# Patient Record
Sex: MALE | Race: WHITE | NOT HISPANIC OR LATINO | Employment: UNEMPLOYED | URBAN - METROPOLITAN AREA
[De-identification: names, ages, dates, MRNs, and addresses within clinical notes are randomized per-mention and may not be internally consistent; named-entity substitution may affect disease eponyms.]

---

## 2018-01-20 ENCOUNTER — HOSPITAL ENCOUNTER (INPATIENT)
Facility: CLINIC | Age: 33
LOS: 5 days | Discharge: NURSING, REHAB OR LONG-TERM CARE | End: 2018-01-26
Attending: INTERNAL MEDICINE | Admitting: PSYCHIATRY & NEUROLOGY
Payer: COMMERCIAL

## 2018-01-20 DIAGNOSIS — K59.00 CONSTIPATION, UNSPECIFIED CONSTIPATION TYPE: ICD-10-CM

## 2018-01-20 DIAGNOSIS — F19.159: ICD-10-CM

## 2018-01-20 DIAGNOSIS — R46.89 AGGRESSIVE BEHAVIOR: ICD-10-CM

## 2018-01-20 DIAGNOSIS — F19.10 POLYSUBSTANCE ABUSE (H): ICD-10-CM

## 2018-01-20 DIAGNOSIS — F32.1 MODERATE SINGLE CURRENT EPISODE OF MAJOR DEPRESSIVE DISORDER (H): Primary | ICD-10-CM

## 2018-01-20 DIAGNOSIS — F15.10 METHAMPHETAMINE ABUSE (H): ICD-10-CM

## 2018-01-20 DIAGNOSIS — S60.221A CONTUSION OF RIGHT HAND, INITIAL ENCOUNTER: ICD-10-CM

## 2018-01-20 DIAGNOSIS — F11.20 METHADONE MAINTENANCE THERAPY PATIENT (H): ICD-10-CM

## 2018-01-20 PROCEDURE — 25000132 ZZH RX MED GY IP 250 OP 250 PS 637: Performed by: INTERNAL MEDICINE

## 2018-01-20 PROCEDURE — 99285 EMERGENCY DEPT VISIT HI MDM: CPT | Mod: Z6 | Performed by: INTERNAL MEDICINE

## 2018-01-20 PROCEDURE — 80320 DRUG SCREEN QUANTALCOHOLS: CPT | Performed by: PSYCHIATRY & NEUROLOGY

## 2018-01-20 PROCEDURE — 80307 DRUG TEST PRSMV CHEM ANLYZR: CPT | Performed by: PSYCHIATRY & NEUROLOGY

## 2018-01-20 PROCEDURE — 99285 EMERGENCY DEPT VISIT HI MDM: CPT | Performed by: INTERNAL MEDICINE

## 2018-01-20 RX ORDER — OLANZAPINE 5 MG/1
10 TABLET, ORALLY DISINTEGRATING ORAL ONCE
Status: COMPLETED | OUTPATIENT
Start: 2018-01-20 | End: 2018-01-20

## 2018-01-20 RX ADMIN — OLANZAPINE 10 MG: 5 TABLET, ORALLY DISINTEGRATING ORAL at 23:54

## 2018-01-20 NOTE — IP AVS SNAPSHOT
[address] [address]    Phone:  [phone]                                       After Visit Summary   1/20/2018    Moisés Cohn    MRN: [id number]           After Visit Summary Signature Page     I have received my discharge instructions, and my questions have been answered. I have discussed any challenges I see with this plan with the nurse or doctor.    ..........................................................................................................................................  Patient/Patient Representative Signature      ..........................................................................................................................................  Patient Representative Print Name and Relationship to Patient    ..................................................               ................................................  Date                                            Time    ..........................................................................................................................................  Reviewed by Signature/Title    ...................................................              ..............................................  Date                                                            Time

## 2018-01-20 NOTE — IP AVS SNAPSHOT
MRN:[id number]                      After Visit Summary   1/20/2018    Moisés Cohn    MRN: [id number]           Thank you!     Thank you for choosing [facility] for your care. Our goal is always to provide you with excellent care.        Patient Information     Date Of Birth          1985        Designated Caregiver       Most Recent Value    Caregiver    Will someone help with your care after discharge? no [Pt reports that he is homeless]      About your hospital stay     You were admitted on:  January 21, 2018 You last received care in the:  [facility]    You were discharged on:  January 26, 2018       Who to Call     For medical emergencies, please call 911.  For non-urgent questions about your medical care, please call your primary care provider or clinic, None          Attending Provider     Provider Specialty    Moisés Govea MD Emergency Medicine    [facility], Fam Douglass MD Psychiatry    Fern Zhang MD Psychiatry       Primary Care Provider Fax #    Physician No Ref-Primary [phone]      Further instructions from your care team        Behavioral Discharge Planning and Instructions      Summary:  You were admitted on 1/20/2018  due to Chemical Use Issues.  You were treated by Dr. 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  Dr. French  Appointment:  [address] [place] [address]  Phone:  [phone]   -While you were hospitalized you had a Rule 25 Chemical Dependency Assessment completed.  This was sent to [facility] as well as [facility] for treatment options.  You will need to follow-up with them upon discharge.   Phone:   [address] [place] [address] [phone]    - Psychiatric Follow-Up  [facility]  Open for Psychiatry Walk-in from 9am-12pm  [facility]  [address] [place], [place]   Phone: [phone]  Fax:  [phone]    Monday, Tuesday, Thursday, Friday: 8 a.m. to 5 p.m.  Wednesday: 8 a.m. to 6 p.m.    Appointments: [phone]  Fax: [phone]    -Shelter's   You were provided with a [facility]. You are planning to go to a shelter. Here are a few additional options in [facility]!  [facility]  [address] [place] [address]  Phone: [phone]    [facility]  [address] [address]  Phone: [phone]    [address] [address]  Phone: [phone]    [address] [address]  Phone: [phone]  Attend all scheduled appointments with your outpatient providers. Call at least 24 hours in advance if you need to reschedule an appointment to ensure continued access to your outpatient providers.   Major Treatments, Procedures and Findings:  You were provided with: a psychiatric assessment, assessed for medical stability, medication evaluation and/or management and individual therapy    Symptoms to Report: feeling more aggressive, increased confusion, losing more sleep, mood getting worse or thoughts of suicide    Early warning signs can include: increased depression or anxiety sleep disturbances increased thoughts or behaviors of suicide or self-harm  increased unusual thinking, such as paranoia or hearing voices    Safety and Wellness:  Take all  "medicines as directed.  Make no changes unless your doctor suggests them.      Follow treatment recommendations.  Refrain from alcohol and non-prescribed drugs.  If there is a concern for safety, call 911.    Resources:   Crisis Intervention: 514.273.2019 or 865-037-5455 (TTY: 673.255.8304).  Call anytime for help.  National [place] on Mental Illness (www.[place].kevin.org): 532.386.1679 or 031-996-7606.  [place] Association for Children's Mental Health (www.mac.org): 865.411.4733.  Alcoholics Anonymous (www.alcoholics-anonymous.org): Check your phone book for your local chapter.  Suicide Awareness Voices of Education (SAVE) (www.save.org): 570-579-LSGW (7844)  National Suicide Prevention Line (www.mentalhealthmn.org): 846-215-ZCQM (9774)  Mental Health Consumer/Survivor Network of [place] (www.mhcsn.net): 582.627.9437 or 049-775-1500  Mental Health Association [place] (www.mentalhealth.org): 738.928.4300 or 199-524-2229  Self- Management and Recovery Training., [employer]-- Toll free: 866.878.1210  www.[employer]kids.S[employer]Clock  [facility] Crisis (COPE) Response - Adult 127 964-1017  [facility] Crisis Response - Adult 559 717-1208  Text 4 Life: txt \"LIFE\" to [address]10 for immediate support and crisis intervention  Crisis text line: Text \"START\" to 281-388. Free, confidential, 24/7.  Crisis Intervention: 741.566.8440 or 302-042-0233. Call anytime for help.   [facility] Health Crisis Team - Child: 100.876.4424  [facility] Mental Health Crisis Response Team - Child: [phone]    The treatment team has appreciated the opportunity to work with you and thank you for choosing the [facility].   If you have any questions or concerns our unit number is [phone].        Pending Results     No orders found from 1/18/2018 to 1/21/2018.            Admission Information     Date & Time Provider Department Dept. Phone    1/20/2018 Fern Zhang MD [address] [phone]      Your " "Vitals Were     Blood Pressure Pulse Temperature Respirations Weight Pulse Oximetry    111/69 63 96.8  F (36  C) (Tympanic) 16 76.7 kg (169 lb) 97%    BMI (Body Mass Index)                   24.96 kg/m2           Diagnostic [employer]     [employer] lets you send messages to your doctor, view your test results, renew your prescriptions, schedule appointments and more. To sign up, go to [email or web address] . Click on \"Log in\" on the left side of the screen, which will take you to the Welcome page. Then click on \"Sign up Now\" on the right side of the page.     You will be asked to enter the access code listed below, as well as some personal information. Please follow the directions to create your username and password.     Your access code is: [email or web address]  Expires: 2018 10:56 PM     Your access code will  in 90 days. If you need help or a new code, please call your [facility] clinic or [phone].        Care EveryWhere ID     This is your Care EveryWhere ID. This could be used by other organizations to access your [facility] medical records  [id number]        Equal Access to Services     DALTON GRIMES AH: Daniel Rice, wakit daley, qaleah kaalmakarlos lock, pop wills. So [facility] [phone].    ATENCIÓN: Si habla español, tiene a nelson disposición servicios gratuitos de asistencia lingüística. Shilpa al [phone].    We comply with applicable federal civil rights laws and [place] laws. We do not discriminate on the basis of race, color, national origin, age, disability, sex, sexual orientation, or gender identity.               Review of your medicines      START taking        Dose / Directions    docusate sodium 100 MG capsule   Commonly known as:  COLACE   Used for:  Constipation, unspecified constipation type        Dose:  100 mg   Take 1 capsule (100 mg) by mouth 2 times daily   Quantity:  60 capsule   Refills:  0       FLUoxetine 10 MG capsule "   Commonly known as:  PROzac   Used for:  Moderate single current episode of major depressive disorder (H)        Dose:  10 mg   Take 1 capsule (10 mg) by mouth daily   Quantity:  30 capsule   Refills:  0       gabapentin 300 MG capsule   Commonly known as:  NEURONTIN        Dose:  300 mg   Take 1 capsule (300 mg) by mouth 3 times daily   Quantity:  90 capsule   Refills:  0       OLANZapine 5 MG tablet   Commonly known as:  zyPREXA        Dose:  5 mg   Take 1 tablet (5 mg) by mouth At Bedtime   Quantity:  30 tablet   Refills:  0         CONTINUE these medicines which have NOT CHANGED        Dose / Directions    albuterol 90 MCG/ACT inhaler        1-2 puffs Q 4-6 hrs prn   Quantity:  1   Refills:  11       methadone 10 MG/ML (HIGH CONC) solution   Commonly known as:  DOLOPHINE-INTENSOL        Dose:  100 mg   Take 100 mg by mouth daily   Refills:  0            Where to get your medicines      These medications were sent to [facility] [address]  [address] [address]     Phone:  [phone]     docusate sodium 100 MG capsule    FLUoxetine 10 MG capsule    gabapentin 300 MG capsule    OLANZapine 5 MG tablet                Protect others around you: Learn how to safely use, store and throw away your medicines at www.disposemymeds.org.        Information about OPIOIDS     PRESCRIPTION OPIOIDS: WHAT YOU NEED TO KNOW    Prescription opioids can be used to help relieve moderate to severe pain and are often prescribed following a surgery or injury, or for certain health conditions. These medications can be an important part of treatment but also come with serious risks. It is important to work with your health care provider to make sure you are getting the safest, most effective care.    WHAT ARE THE RISKS AND SIDE EFFECTS OF OPIOID USE?  Prescription opioids carry serious risks of addiction and overdose, especially with prolonged use. An opioid overdose, often  marked by slowed breathing can cause sudden death. The use of prescription opioids can have a number of side effects as well, even when taken as directed:      Tolerance - meaning you might need to take more of a medication for the same pain relief    Physical dependence - meaning you have symptoms of withdrawal when a medication is stopped    Increased sensitivity to pain    Constipation    Nausea, vomiting, and dry mouth    Sleepiness and dizziness    Confusion    Depression    Low levels of testosterone that can result in lower sex drive, energy, and strength    Itching and sweating    RISKS ARE GREATER WITH:    History of drug misuse, substance use disorder, or overdose    Mental health conditions (such as depression or anxiety)    Sleep apnea    Older age (65 years or older)    Pregnancy    Avoid alcohol while taking prescription opioids.   Also, unless specifically advised by your health care provider, medications to avoid include:    Benzodiazepines (such as Xanax or Valium)    Muscle relaxants (such as Soma or Flexeril)    Hypnotics (such as Ambien or Lunesta)    Other prescription opioids    KNOW YOUR OPTIONS:  Talk to your health care provider about ways to manage your pain that do not involve prescription opioids. Some of these options may actually work better and have fewer risks and side effects:    Pain relievers such as acetaminophen, ibuprofen, and naproxen    Some medications that are also used for depression or seizures    Physical therapy and exercise    Cognitive behavioral therapy, a psychological, goal-directed approach, in which patients learn how to modify physical, behavioral, and emotional triggers of pain and stress    IF YOU ARE PRESCRIBED OPIOIDS FOR PAIN:    Never take opioids in greater amounts or more often than prescribed    Follow up with your primary health care provider and work together to create a plan on how to manage your pain.    Talk about ways to help manage your pain that  do not involve prescription opioids    Talk about all concerns and side effects    Help prevent misuse and abuse    Never sell or share prescription opioids    Never use another person's prescription opioids    Store prescription opioids in a secure place and out of reach of others (this may include visitors, children, friends, and family)    Visit www.cdc.gov/drugoverdose to learn about risks of opioid abuse and overdose    If you believe you may be struggling with addiction, tell your health care provider and ask for guidance or call [facility]'s National Helpline at 5-482-024-HELP    LEARN MORE / www.cdc.gov/drugoverdose/prescribing/guideline.html    Safely dispose of unused prescription opioids: Find your local drug take-back programs and more information about the importance of safe disposal at www.doseofreality.mn.gov             Medication List: This is a list of all your medications and when to take them. Check marks below indicate your daily home schedule. Keep this list as a reference.      Medications           Morning Afternoon Evening Bedtime As Needed    albuterol 90 MCG/ACT inhaler   1-2 puffs Q 4-6 hrs prn                                   docusate sodium 100 MG capsule   Commonly known as:  COLACE   Take 1 capsule (100 mg) by mouth 2 times daily   Last time this was given:  100 mg on 1/26/2018  7:52 AM                                      FLUoxetine 10 MG capsule   Commonly known as:  PROzac   Take 1 capsule (10 mg) by mouth daily   Last time this was given:  10 mg on 1/26/2018  7:52 AM                                   gabapentin 300 MG capsule   Commonly known as:  NEURONTIN   Take 1 capsule (300 mg) by mouth 3 times daily   Last time this was given:  300 mg on 1/26/2018  7:52 AM                                         methadone 10 MG/ML (HIGH CONC) solution   Commonly known as:  DOLOPHINE-INTENSOL   Take 100 mg by mouth daily                                   OLANZapine 5 MG tablet   Commonly known  as:  zyPREXA   Take 1 tablet (5 mg) by mouth At Bedtime   Last time this was given:  5 mg on 1/25/2018  7:55 PM

## 2018-01-21 ENCOUNTER — APPOINTMENT (OUTPATIENT)
Dept: GENERAL RADIOLOGY | Facility: CLINIC | Age: 33
End: 2018-01-21
Attending: INTERNAL MEDICINE
Payer: COMMERCIAL

## 2018-01-21 PROBLEM — F19.159: Status: ACTIVE | Noted: 2018-01-21

## 2018-01-21 LAB
AMPHETAMINES UR QL SCN: POSITIVE
BARBITURATES UR QL: NEGATIVE
BENZODIAZ UR QL: NEGATIVE
CANNABINOIDS UR QL SCN: NEGATIVE
COCAINE UR QL: NEGATIVE
ETHANOL UR QL SCN: NEGATIVE
OPIATES UR QL SCN: NEGATIVE

## 2018-01-21 PROCEDURE — 73130 X-RAY EXAM OF HAND: CPT | Mod: RT

## 2018-01-21 PROCEDURE — 12400003 ZZH R&B MH CRITICAL UMMC

## 2018-01-21 PROCEDURE — 99221 1ST HOSP IP/OBS SF/LOW 40: CPT | Mod: AI | Performed by: NURSE PRACTITIONER

## 2018-01-21 PROCEDURE — 25000132 ZZH RX MED GY IP 250 OP 250 PS 637: Performed by: NURSE PRACTITIONER

## 2018-01-21 RX ORDER — METHADONE HYDROCHLORIDE 10 MG/ML
100 CONCENTRATE ORAL DAILY
COMMUNITY

## 2018-01-21 RX ORDER — ALBUTEROL SULFATE 90 UG/1
2 AEROSOL, METERED RESPIRATORY (INHALATION) EVERY 4 HOURS PRN
Status: DISCONTINUED | OUTPATIENT
Start: 2018-01-21 | End: 2018-01-26 | Stop reason: HOSPADM

## 2018-01-21 RX ORDER — ACETAMINOPHEN 325 MG/1
650 TABLET ORAL EVERY 4 HOURS PRN
Status: DISCONTINUED | OUTPATIENT
Start: 2018-01-21 | End: 2018-01-26 | Stop reason: HOSPADM

## 2018-01-21 RX ORDER — TRAZODONE HYDROCHLORIDE 50 MG/1
50 TABLET, FILM COATED ORAL AT BEDTIME
Status: DISCONTINUED | OUTPATIENT
Start: 2018-01-21 | End: 2018-01-21

## 2018-01-21 RX ORDER — OLANZAPINE 5 MG/1
5 TABLET ORAL AT BEDTIME
Status: DISCONTINUED | OUTPATIENT
Start: 2018-01-21 | End: 2018-01-26 | Stop reason: HOSPADM

## 2018-01-21 RX ORDER — OLANZAPINE 10 MG/2ML
10 INJECTION, POWDER, FOR SOLUTION INTRAMUSCULAR 3 TIMES DAILY PRN
Status: DISCONTINUED | OUTPATIENT
Start: 2018-01-21 | End: 2018-01-26 | Stop reason: HOSPADM

## 2018-01-21 RX ORDER — POLYETHYLENE GLYCOL 3350 17 G
2-4 POWDER IN PACKET (EA) ORAL
Status: DISCONTINUED | OUTPATIENT
Start: 2018-01-21 | End: 2018-01-24

## 2018-01-21 RX ORDER — ONDANSETRON 4 MG/1
4 TABLET, ORALLY DISINTEGRATING ORAL EVERY 6 HOURS PRN
Status: DISCONTINUED | OUTPATIENT
Start: 2018-01-21 | End: 2018-01-26 | Stop reason: HOSPADM

## 2018-01-21 RX ORDER — TRAZODONE HYDROCHLORIDE 50 MG/1
50-100 TABLET, FILM COATED ORAL
Status: DISCONTINUED | OUTPATIENT
Start: 2018-01-21 | End: 2018-01-26 | Stop reason: HOSPADM

## 2018-01-21 RX ORDER — OLANZAPINE 5 MG/1
5-10 TABLET, ORALLY DISINTEGRATING ORAL 2 TIMES DAILY PRN
Status: DISCONTINUED | OUTPATIENT
Start: 2018-01-21 | End: 2018-01-21

## 2018-01-21 RX ORDER — OLANZAPINE 5 MG/1
5-10 TABLET, ORALLY DISINTEGRATING ORAL 3 TIMES DAILY PRN
Status: DISCONTINUED | OUTPATIENT
Start: 2018-01-21 | End: 2018-01-26 | Stop reason: HOSPADM

## 2018-01-21 RX ORDER — ALUMINA, MAGNESIA, AND SIMETHICONE 2400; 2400; 240 MG/30ML; MG/30ML; MG/30ML
30 SUSPENSION ORAL EVERY 4 HOURS PRN
Status: DISCONTINUED | OUTPATIENT
Start: 2018-01-21 | End: 2018-01-26 | Stop reason: HOSPADM

## 2018-01-21 RX ORDER — HYDROXYZINE HYDROCHLORIDE 25 MG/1
25-50 TABLET, FILM COATED ORAL EVERY 4 HOURS PRN
Status: DISCONTINUED | OUTPATIENT
Start: 2018-01-21 | End: 2018-01-26 | Stop reason: HOSPADM

## 2018-01-21 RX ORDER — IBUPROFEN 600 MG/1
600 TABLET, FILM COATED ORAL EVERY 6 HOURS PRN
Status: DISCONTINUED | OUTPATIENT
Start: 2018-01-21 | End: 2018-01-26 | Stop reason: HOSPADM

## 2018-01-21 RX ORDER — CLONIDINE HYDROCHLORIDE 0.1 MG/1
0.1 TABLET ORAL EVERY 6 HOURS PRN
Status: DISCONTINUED | OUTPATIENT
Start: 2018-01-21 | End: 2018-01-26 | Stop reason: HOSPADM

## 2018-01-21 RX ORDER — LOPERAMIDE HCL 2 MG
2 CAPSULE ORAL 4 TIMES DAILY PRN
Status: DISCONTINUED | OUTPATIENT
Start: 2018-01-21 | End: 2018-01-26 | Stop reason: HOSPADM

## 2018-01-21 RX ORDER — GABAPENTIN 100 MG/1
200 CAPSULE ORAL 3 TIMES DAILY
Status: DISCONTINUED | OUTPATIENT
Start: 2018-01-21 | End: 2018-01-24

## 2018-01-21 RX ADMIN — OLANZAPINE 5 MG: 5 TABLET, FILM COATED ORAL at 21:14

## 2018-01-21 RX ADMIN — GABAPENTIN 200 MG: 100 CAPSULE ORAL at 09:06

## 2018-01-21 RX ADMIN — GABAPENTIN 200 MG: 100 CAPSULE ORAL at 21:14

## 2018-01-21 RX ADMIN — GABAPENTIN 200 MG: 100 CAPSULE ORAL at 14:29

## 2018-01-21 ASSESSMENT — ACTIVITIES OF DAILY LIVING (ADL)
DRESS: SCRUBS (BEHAVIORAL HEALTH)
ORAL_HYGIENE: INDEPENDENT
LAUNDRY: UNABLE TO COMPLETE
ORAL_HYGIENE: INDEPENDENT
HYGIENE/GROOMING: INDEPENDENT
DRESS: SCRUBS (BEHAVIORAL HEALTH)
GROOMING: INDEPENDENT

## 2018-01-21 ASSESSMENT — ENCOUNTER SYMPTOMS
SLEEP DISTURBANCE: 1
SPEECH DIFFICULTY: 0
DIFFICULTY URINATING: 0
NERVOUS/ANXIOUS: 1
BACK PAIN: 0
VOMITING: 0
HEADACHES: 0
AGITATION: 1
NUMBNESS: 0
SHORTNESS OF BREATH: 0
WEAKNESS: 0
PALPITATIONS: 0
HALLUCINATIONS: 1
ABDOMINAL PAIN: 0
FEVER: 0
CHILLS: 0
NAUSEA: 0
COUGH: 0
ADENOPATHY: 0
LIGHT-HEADEDNESS: 0
NECK PAIN: 0
DYSPHORIC MOOD: 1

## 2018-01-21 NOTE — PLAN OF CARE
"Problem: Behavioral Disturbance  Goal: Behavioral Disturbance  Signs and symptoms of listed problems will be absent or manageable by discharge or transition of care.  31yo white male admitted voluntarily through [facility] ED per wheelchair.  Pt reports long history of Bipolar Illness and polydrug abuse  including IV Meth, Benzos, and ETOH  with last use  2 days ago.  Pt's tox screen is positive for amphetamines. On admission to the   ED pt had reported both homicidal and suicidal ideation.  He described severe agitation with 'out of control' behavior.  Prior to coming to  the hospital pt punched a wall injuring his R hand.  His hand xray is negative.  He was given PRN Zyprexa in the ED and had fallen asleep    prior to being brought to the unit.    Pt cooperated with safety search on arrival to the unit however he refused vitals and he refused to participate in the admission   Assessment.  He does say that he is homeless and lives with various people.  He denies medical issues.  He says that he has been   \"hospitalized more than 50 times and I can't remember the last time\".  He states \"I use IV meth every other day-like a 1/2 Gram\".  Pt is loud, hostile and dismissive. States \"I don't want to talk-I don't want to answer these questions-I just want to sleep.\"  \"I have auditory hallucinations and I'm violent as Fuck!\"    Pt was settled to bed almost immediately after his admission to the unit.          "

## 2018-01-21 NOTE — H&P
"IDENTIFYING INFORMATION:  Mr. Moisés Cohn is a 32-year-old male admitted to the [facility], [place], station [address] [place].  He was admitted as a voluntary patient through the emergency department due to mood instability, agitation and psychosis.  He has a history of abusing a variety of substances.  Methamphetamine is his drug of choice and U-tox was positive for amphetamines.      CHIEF COMPLAINT:  \"Really depressed.\"      HISTORY OF PRESENT ILLNESS:  Mr. Cohn provides minimal information for this assessment.  He was somnolent and agitated, and requested that the conversation be brief.        INTAKE DATA:  Records from the ER and records from his previous hospitalization were reviewed.      Mr. Cohn has previous diagnoses of depression versus bipolar disorder, generalized anxiety disorder, alcohol use disorder, cannabis use disorder, stimulant (methamphetamine) use disorder and opiate use disorder.  He states that he is homeless and stays \"anywhere,\" mostly outside.  He has not been taking medications recently and said that in general, \"I don't really take meds.\"  The patient states that he is on methadone maintenance 100 mg daily through [facility].  He says that his last dose was on Friday and that he does not have possession of his takeout doses for the weekend.  [facility] was closed and we have been unable to verify this.  U-tox did not show the presence of opiates.  He reports that he has been using IV methamphetamine \"every couple days\" with last use 2 days ago.  He currently reports that he last consumed alcohol two days ago and does not generally drink in excess.  He has been struggling with anger and mood instability.  He has had verbal and physical altercations including with members of the police force.  He recently punched a wall and has a swollen right hand.  X-ray showed no evidence of fracture.  He was voluntarily hospitalized on station [address] [place].  He refused to " "participate in the RN admission assessment, stating \"I have auditory hallucinations and I'm violent as fuck.\"   He was minimally cooperative with today's assessment.  He states that he is considering the possibility of CD treatment.      During the present assessment, the patient reports that he is \"hearing voices and really stressed out and really violent.\"  He is not sure what the auditory hallucinations say.  He denies visual hallucinations.  He endorses paranoid thought content that people want to harm him.  He believes that others can read his mind.  He denies receiving messages from the TV or radio.  His sleep and appetite are \"okay.\"  He continues to endorse thoughts of harming others, but nobody specifically.  He denies homicidal ideation.  He endorses racing thoughts and anxiety.       PAST PSYCHIATRIC HISTORY:  The patient reports a history of 2 prior psychiatric hospitalizations.  He was hospitalized at [facility] in 2012 and was discharged AMA after he eloped from a pass for Rule 25.  He has a history of suicide attempt in 2012 by shooting up meth.  He does not have a .  His psychiatrist is Dr. Vela at [facility].  ER notes indicate he saw his psychiatrist 2 days ago; however, he informed me he last saw him 1-2 months ago.  In the past he took Effexor, which caused \"electric jolts.\"  He also took BuSpar for a short period of time and it was ineffective.  He has also taken Wellbutrin; other people noticed that his mood appeared improved, but he did not notice a difference.  He says that Xanax was helpful in the past.  He has also taken Zoloft, lithium, Risperdal and Paxil.  He states that only Zyprexa and Neurontin are beneficial for him.  He denies any history of commitment.      SUBSTANCE USE HISTORY:  He began using marijuana and methamphetamine at age 13.  His use of methamphetamine has progressed and he now uses IV meth several days per week.  He reports occasional use of alcohol and denies " "drinking in excess recently.  In the past he has consumed up to 1 liter of hard liquor daily.  He has abused a variety of prescription medications including Vicodin, Percocet, methadone and Klonopin.  He says that he is on methadone maintenance through [facility], but this was unable to be confirmed.  He smokes 1/2 pack of cigarettes per day.  He states that he has been to CD treatment in the past.  He had a DUI around the age of 17.      PAST MEDICAL HISTORY:   1.  Asthma.   2.  Rhinoplasty.       No history of seizures.      REVIEW OF SYSTEMS:  He endorses right hand pain as a result of punching a wall.  He also has right foot pain and a blister on the bottom of his right foot.  A 10-point review of systems was completed and is otherwise negative with the exception of HPI.      PHYSICAL EXAMINATION:  Please refer to the exam completed by Dr. Govea in the emergency department 01/20/2018.      PRIOR TO ADMISSION MEDICATIONS:  Albuterol 2 puffs inhaled every 4 hours as needed for shortness of breath.      ALLERGIES:  AMOXICILLIN.      LABORATORY DATA:  Urine toxicology was positive for amphetamines.  Other laboratory data are pending collection.      VITAL SIGNS:  Temperature 96.8, blood pressure 119/45, respirations 16, pulse 99.      FAMILY HISTORY:  Per records, he says that several members of his family are \"high strung\" but denies any known history of mental illness.  Records indicate it is unclear whether his mother was addicted to Xanax and/or methadone.  However, currently he is denying any family history of chemical dependency.  No family history of suicide.      SOCIAL HISTORY:  Per records, he was raised by his mother and had minimal contact with his father.  His childhood was \"okay, stressful\" due to his \"high-strung family.\" Highest level of education is a GED.  When he was 17, he was in prison for auto theft.  He states that he is not currently on probation and does not have any recent legal issues. " " During the present assessment he said he has never been  and has no children.  However, during his assessment in 2012, he reported that he did have 1 child with his former girlfriend and that the child was in foster care.  In the past he worked at [employer] for a couple years.  He has also done [employer] work.  He states that he has not worked in \"years.\"  He is homeless.      MENTAL STATUS EXAMINATION:  He was awake, somnolent, disheveled.  Attitude was somewhat cooperative.  He made no eye contact.  He said that his mood is irritable, agitated, anxious and depressed.  He currently denies suicidal thoughts.  He has auditory hallucinations.  He has thoughts of harming others without a specific target.  He has paranoid thought content that other people will harm him.  Insight was limited.  Judgment was limited.  He was oriented to person, place, month and year.  Attention span and concentration were impaired.  Recent and remote memory were somewhat impaired.  He had no peculiar use of language.  Fund of knowledge was appropriate.  Muscle strength and tone were normal.  Gait and station were normal.      DIAGNOSES:   1.  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   2.  Stimulant use disorder, severe.   3.  History of alcohol use disorder.   3.  History of cannabis use disorder.   4.  History of opiate use disorder.   5.  Nicotine use disorder.   6.  Asthma.      RECOMMENDATIONS:  Mr. Cohn will continue as a voluntary patient on station [address] [place].  His care will be assumed by Dr. Paredes on Monday.  We will encourage him to be involved in unit activities as he is able.  We discussed options for medication management.  He requested Zyprexa 5 mg at bedtime.  He refuses a higher dose because he took 10 mg last night and feels somnolent today.  Zyprexa is also available as needed.  Scheduled Neurontin will be initiated.  Trazodone is " available as needed for insomnia.  He is on the opiate withdrawal scale with vital signs as he states he is on Methadone 100 mg daily through [facility]; however, this dose is unable to be confirmed during the weekend.  For the present time, he will have comfort medications including clonidine, Zofran, Imodium and ibuprofen available if he does experience opiate withdrawal symptoms.  He states that he is interested in CD treatment.  He does have an outpatient psychiatrist.  I provided him with information regarding the risks and benefits of this treatment plan including medications, and he provided consent.         CHAITANYA GAMEZ NP             D: 2018 08:55   T: 2018 10:08   MT: KENY      Name:     AMEE DOBBINS   MRN:      [id number]        Account:      [id number]   :      1985           Admitted:     [id number]      Document: [id number]

## 2018-01-21 NOTE — PROGRESS NOTES
01/21/18 0256   Patient Belongings   Did you bring any home meds/supplements to the hospital?  Yes   Disposition of meds  Sent to security/pharmacy per site process   Patient Belongings clothing;shoes;wallet;other (see comments)   Disposition of Belongings Locker;Sent to security per site process   Belongings Search Yes   Clothing Search Yes   Second Staff Lea DESAI       -Items placed in locker on st12-  Hat  Belt  Lighter  Disposable warm pack  Phone   Citation document  Coat  2 hooded sweaters  Shorts  3 t shirts  5 pairs of socks  2 pants  Boots  Wallet  Backpack  Eye mask  Tooth brush  Tooth paste  Floss pick  Matches  Ear plug  Razor  Pen  Napkins  Sweater  2 sleeveless tops  Underwear  SA rewards card  [employer] rewards card  [employer] member card  Community card  Library card  [place] ID [id number]  [facility] card [id number]  [place] health card [id number]    -Items sent to security in envelope #[id number]  [place] EBT [id number]  [place] card [id number]    Albuterol inhaler sent in security envelope #[id number]    -Items sent to security in envelope #[id number]-  Syringe  Tide pod  Unknown liquid in small bottle  Sealed/unopened bag with  and cigarette paper    A               Admission:  I am responsible for any personal items that are not sent to the safe or pharmacy.  [facility] is not responsible for loss, theft or damage of any property in my possession.    Signature:  _________________________________ Date: _______  Time: _____                                              Staff Signature:  ____________________________ Date: ________  Time: _____      2nd Staff person, if patient is unable/unwilling to sign:    Signature: ________________________________ Date: ________  Time: _____     Discharge:  [facility] has returned all of my personal belongings:    Signature: _________________________________ Date: ________  Time: _____                                          Staff Signature:  ____________________________ Date: ________  Time: _____

## 2018-01-21 NOTE — PROGRESS NOTES
Initial Psychosocial Assessment    I have reviewed the chart, met with the patient, and developed Care Plan.  Information for assessment was obtained from patient and chart notes.     Presenting Problem:  Patient was admitted on a voluntary basis with auditory hallucinations and having episodes of agitation and out of control behaviors in public.  He has been off medications for several months, using meth.      History of Mental Health and Chemical Dependency:  Patient has a history of bipolar and substance abuse.  He has had multiple prior CD treatments. Most recent admit at [facility] was September 2012.    Family Description ([place], Family Psychiatric History):  Patient was raised by mother, minimal contact with father.     Significant Life Events (Illness, Abuse, Trauma, Death):  Not able to assess.    Living Situation:  Patient is homeless and has been staying with friends.     Educational Background:  GED    Occupational History:  Patient is not employed    Financial Status:  Patient receives GA and food support.  [employer] medical coverage.    Legal Issues:  History of prison time at age 17 for auto theft.     Ethnic/Cultural Issues:  None noted.    Spiritual Orientation:  Not assessed.     Service History:  Not assessed.    Social Functioning (organization, interests):  Not assessed.     Current Treatment Providers are:  Psychiatrist Dr. Vela [facility]? [phone]  [facility] for methadone maintenance [address] [place] [address],  [phone].    Social Service Assessment/Plan:  Patient has been seen by the on-call psychiatric staff.  Medications have been discussed and some medications initiated. Withdrawal is being monitored. Patient offered minimal information during interview due to being very tired and ill.  He indicated to writer he does not know what type of help or support he needs yet.  H & P indicates a possible interest in CD treatment and if so patient may need to  complete a Rule 25 evaluation.  Awaiting confirmation on patient's methadone maintenance program.  Patient indicates that Dr. Vela has changed clinics and patient does not remember where he practices now.  Internet search found a Dr. Nicholas Vela at [facility] but it will need to be confirmed if this is the correct doctor and location.  Patient will meet with the Treatment Team on Monday to further coordinate plan of care.

## 2018-01-21 NOTE — ED PROVIDER NOTES
History     Chief Complaint   Patient presents with     Hallucinations     Hearing voices to harm others.     HPI  Moisés Cohn is a 32 year old male who presents with agitation and out of control behaviors today. He has a history of chronic mental illness dating back to his early 20's. He was treated for bipolar disorder with rapid cycling and in the past was on lithium. Currently he is supposed to be on Zyprexa, Wellbutrin and gabapentin as his mood stabilizer. He has been off his medications for he believes several months. He is on methadone maintenance through [facility]. He states his current dose is 100 mg and that he has been taking it. He uses methamphetamine and states his last use was 2 days ago and that he last used alcohol 2 days ago. He states he sees Dr Vela in psychiatry and last saw him 2 days ago. He denies other substance abuse. He states that he has had anger and mood lability and has been getting in physical and verbal altercations today. He states that he was placed in handcuffs today twice by police, once when he impulsively pushed over a bookshelf at a light rail station and once when he got into a verbal altercation with police. He feels he has been receiving messages from other people today and has had some auditory hallucinations. He states the content varies from person to person. He has had urge to punch people today and he did punch a wall a couple of times. He has had vague suicidal thoughts including overdosing, but has not felt urge to act on this. He sleeps for about 12 hours every other day. He stays with friends or is homeless. He doesn't work. He denies incarceration or current legal issues. His appetite has been OK. He notes some swollen nodes in his groin. He has had no skin rash or urethral discharge. He notes a swollen knuckle on his right hand after punching a wall. He states it was more deformed after a first punch, but after a second punch seemed to straighten  out. He denies visual hallucinations. He denies headache, visual changes, URI symptoms, cough, sputum, shortness of breath, chest pain, palpitations, cough, nausea, vomiting, abdominal pain.    PAST MEDICAL HISTORY:   Past Medical History:   Diagnosis Date     Allergy      Anxiety      Asthma      Depressive disorder      Substance abuse        PAST SURGICAL HISTORY:   Past Surgical History:   Procedure Laterality Date     ENT SURGERY      rhinoplasty       FAMILY HISTORY:   Family History   Problem Relation Age of Onset     C.A.D. Maternal Grandmother      Neurologic Disorder Father      die from cyst in brain     CEREBROVASCULAR DISEASE Mother        SOCIAL HISTORY:   Social History   Substance Use Topics     Smoking status: Current Every Day Smoker     Packs/day: 0.50     Years: 6.00     Types: Cigarettes     Smokeless tobacco: Never Used      Comment: quitting     Alcohol use Yes      Comment: occasional         I have reviewed the Medications, Allergies, Past Medical and Surgical History, and Social History in the Epic system.    Review of Systems   Constitutional: Negative for chills and fever.   HENT: Negative for congestion.    Eyes: Negative for visual disturbance.   Respiratory: Negative for cough and shortness of breath.    Cardiovascular: Negative for chest pain, palpitations and leg swelling.   Gastrointestinal: Negative for abdominal pain, nausea and vomiting.   Genitourinary: Negative for difficulty urinating.   Musculoskeletal: Negative for back pain and neck pain.   Skin: Negative for rash.   Neurological: Negative for speech difficulty, weakness, light-headedness, numbness and headaches.   Hematological: Negative for adenopathy.   Psychiatric/Behavioral: Positive for agitation, behavioral problems, dysphoric mood, hallucinations, sleep disturbance and suicidal ideas. Negative for self-injury. The patient is nervous/anxious.        Physical Exam   BP: 119/45  Pulse: 73  Heart Rate: 73  Temp: 96.3  F  (35.7  C)  Resp: 16  Weight: 77.1 kg (170 lb)  SpO2: 99 %      Physical Exam   Constitutional: He is oriented to person, place, and time. He appears well-developed and well-nourished. No distress.   HENT:   Head: Normocephalic and atraumatic.   Right Ear: External ear normal.   Left Ear: External ear normal.   Nose: Nose normal.   Mouth/Throat: Oropharynx is clear and moist. No oropharyngeal exudate.   Eyes: EOM are normal. Pupils are equal, round, and reactive to light. No scleral icterus.   Neck: Normal range of motion. Neck supple. No JVD present.   Cardiovascular: Normal rate, regular rhythm and normal heart sounds.  Exam reveals no friction rub.    No murmur heard.  Pulmonary/Chest: Effort normal and breath sounds normal. He has no wheezes. He has no rales.   Abdominal: Soft. Bowel sounds are normal. There is no tenderness. There is no rebound and no guarding.   Genitourinary: Penis normal.   Musculoskeletal: He exhibits tenderness. He exhibits no edema.        Right hand: He exhibits tenderness and bony tenderness. He exhibits normal range of motion, normal two-point discrimination, normal capillary refill, no deformity, no laceration and no swelling. Normal sensation noted. Normal strength noted.        Hands:  Lymphadenopathy:     He has no cervical adenopathy.        Right: Inguinal adenopathy present.        Left: Inguinal adenopathy present.   Shotty inguinal nodes bilaterally <0.5 cm   Neurological: He is alert and oriented to person, place, and time. No cranial nerve deficit.   Skin: Skin is warm and dry.   Psychiatric: His speech is normal. His mood appears not anxious. He is actively hallucinating. He is not agitated, not aggressive, not hyperactive and not combative. Thought content is paranoid. Cognition and memory are impaired. He expresses impulsivity. He expresses suicidal ideation. He expresses no homicidal ideation. He expresses no suicidal plans.   Calm and cooperative in the ED. He is  attentive.   Nursing note and vitals reviewed.      ED Course     ED Course     Procedures        Labs/Imaging    Results for orders placed or performed during the hospital encounter of 01/20/18 (from the past 24 hour(s))   Drug abuse screen 6 urine (chem dep) ([facility])   Result Value Ref Range    Amphetamine Qual Urine Positive (A) NEG^Negative    Barbiturates Qual Urine Negative NEG^Negative    Benzodiazepine Qual Urine Negative NEG^Negative    Cannabinoids Qual Urine Negative NEG^Negative    Cocaine Qual Urine Negative NEG^Negative    Ethanol Qual Urine Negative NEG^Negative    Opiates Qualitative Urine Negative NEG^Negative   XR Hand Port Right G/E 3 Views    Narrative    XR HAND PORT RT G/E 3 VW  1/21/2018 12:29 AM      HISTORY: Punched wall.     COMPARISON: None.      Impression    IMPRESSION: No acute fracture or dislocation.       Assessments & Plan (with Medical Decision Making)   Impression:  Young male with a history of mood disorder possibly bipolar affective disorder, more recently superimposed with significant substance induced mood disorder. He has ongoing methamphetamine and alcohol abuse and is on methadone maintenance for opiate dependence. He has poor social support system. He has probable antisocial personality traits. Today he presents with auditory hallucinations and receiving telepathic messages, anger and anxiety and verbally and physically aggressive behavior directed at others. He has vague suicidal thoughts. He has gotten into 2 altercations resulting in handcuffing by police today and has been destructive to property. His behavior has been escalating. I suspect there is some component of methamphetamine induced psychosis. He has bruising of his right 3rd knuckle, but no fracture on Xray. He is calm and cooperative in the ED. He states he has been off his medications for some period of time. He is willing to take zyprexa for control of anxiety and aggressive thoughts and was given 10 mg  orally in the ED. He is agreeable to voluntary hospitalization for stabilization.    I have reviewed the nursing notes.    I have reviewed the findings, diagnosis, plan and need for follow up with the patient.    New Prescriptions    No medications on file       Final diagnoses:   Oth psychoactive substance abuse w psychotic disorder, unsp (H)   Methamphetamine abuse   Polysubstance abuse   Aggressive behavior   Contusion of right hand, initial encounter   Methadone maintenance therapy patient (H)       1/20/2018   [facility], [place], EMERGENCY DEPARTMENT     Moisés Govea MD  01/21/18 0059       Moisés Govea MD  01/21/18 0106

## 2018-01-21 NOTE — PROGRESS NOTES
Writer attempted to call pt's stated [facility] clinic this morning for dose verification. Clinic is currently closed and call with need to be made tomorrow  [facility], on [address]  [phone]

## 2018-01-22 LAB
ALBUMIN SERPL-MCNC: 2.9 G/DL (ref 3.4–5)
ALP SERPL-CCNC: 74 U/L (ref 40–150)
ALT SERPL W P-5'-P-CCNC: 15 U/L (ref 0–70)
ANION GAP SERPL CALCULATED.3IONS-SCNC: 5 MMOL/L (ref 3–14)
AST SERPL W P-5'-P-CCNC: 14 U/L (ref 0–45)
BILIRUB SERPL-MCNC: 0.6 MG/DL (ref 0.2–1.3)
BUN SERPL-MCNC: 5 MG/DL (ref 7–30)
CALCIUM SERPL-MCNC: 8.1 MG/DL (ref 8.5–10.1)
CHLORIDE SERPL-SCNC: 105 MMOL/L (ref 94–109)
CHOLEST SERPL-MCNC: 113 MG/DL
CO2 SERPL-SCNC: 30 MMOL/L (ref 20–32)
CREAT SERPL-MCNC: 0.58 MG/DL (ref 0.66–1.25)
ERYTHROCYTE [DISTWIDTH] IN BLOOD BY AUTOMATED COUNT: 13.8 % (ref 10–15)
GFR SERPL CREATININE-BSD FRML MDRD: >90 ML/MIN/1.7M2
GLUCOSE SERPL-MCNC: 128 MG/DL (ref 70–99)
HCT VFR BLD AUTO: 40.6 % (ref 40–53)
HCV AB SERPL QL IA: NONREACTIVE
HDLC SERPL-MCNC: 53 MG/DL
HGB BLD-MCNC: 13.3 G/DL (ref 13.3–17.7)
HIV 1+2 AB+HIV1 P24 AG SERPL QL IA: NONREACTIVE
LDLC SERPL CALC-MCNC: 41 MG/DL
MCH RBC QN AUTO: 33.6 PG (ref 26.5–33)
MCHC RBC AUTO-ENTMCNC: 32.8 G/DL (ref 31.5–36.5)
MCV RBC AUTO: 103 FL (ref 78–100)
NONHDLC SERPL-MCNC: 60 MG/DL
PLATELET # BLD AUTO: 158 10E9/L (ref 150–450)
POTASSIUM SERPL-SCNC: 3.5 MMOL/L (ref 3.4–5.3)
PROT SERPL-MCNC: 5.8 G/DL (ref 6.8–8.8)
RBC # BLD AUTO: 3.96 10E12/L (ref 4.4–5.9)
SODIUM SERPL-SCNC: 140 MMOL/L (ref 133–144)
TRIGL SERPL-MCNC: 95 MG/DL
TSH SERPL DL<=0.005 MIU/L-ACNC: 0.9 MU/L (ref 0.4–4)
WBC # BLD AUTO: 4.3 10E9/L (ref 4–11)

## 2018-01-22 PROCEDURE — 36415 COLL VENOUS BLD VENIPUNCTURE: CPT | Performed by: NURSE PRACTITIONER

## 2018-01-22 PROCEDURE — 12400003 ZZH R&B MH CRITICAL UMMC

## 2018-01-22 PROCEDURE — 86803 HEPATITIS C AB TEST: CPT | Performed by: NURSE PRACTITIONER

## 2018-01-22 PROCEDURE — 25000132 ZZH RX MED GY IP 250 OP 250 PS 637: Performed by: INTERNAL MEDICINE

## 2018-01-22 PROCEDURE — 80053 COMPREHEN METABOLIC PANEL: CPT | Performed by: NURSE PRACTITIONER

## 2018-01-22 PROCEDURE — 84443 ASSAY THYROID STIM HORMONE: CPT | Performed by: NURSE PRACTITIONER

## 2018-01-22 PROCEDURE — 99232 SBSQ HOSP IP/OBS MODERATE 35: CPT | Mod: GC | Performed by: PSYCHIATRY & NEUROLOGY

## 2018-01-22 PROCEDURE — 80061 LIPID PANEL: CPT | Performed by: NURSE PRACTITIONER

## 2018-01-22 PROCEDURE — 25000132 ZZH RX MED GY IP 250 OP 250 PS 637: Performed by: STUDENT IN AN ORGANIZED HEALTH CARE EDUCATION/TRAINING PROGRAM

## 2018-01-22 PROCEDURE — HZ2ZZZZ DETOXIFICATION SERVICES FOR SUBSTANCE ABUSE TREATMENT: ICD-10-PCS | Performed by: PSYCHIATRY & NEUROLOGY

## 2018-01-22 PROCEDURE — 85027 COMPLETE CBC AUTOMATED: CPT | Performed by: NURSE PRACTITIONER

## 2018-01-22 PROCEDURE — 87389 HIV-1 AG W/HIV-1&-2 AB AG IA: CPT | Performed by: NURSE PRACTITIONER

## 2018-01-22 PROCEDURE — 25000132 ZZH RX MED GY IP 250 OP 250 PS 637: Performed by: NURSE PRACTITIONER

## 2018-01-22 RX ORDER — METHADONE HYDROCHLORIDE 5 MG/5ML
100 SOLUTION ORAL ONCE
Status: COMPLETED | OUTPATIENT
Start: 2018-01-22 | End: 2018-01-22

## 2018-01-22 RX ADMIN — GABAPENTIN 200 MG: 100 CAPSULE ORAL at 20:44

## 2018-01-22 RX ADMIN — HYDROXYZINE HYDROCHLORIDE 50 MG: 25 TABLET ORAL at 17:41

## 2018-01-22 RX ADMIN — METHADONE HYDROCHLORIDE 100 MG: 5 SOLUTION ORAL at 13:29

## 2018-01-22 RX ADMIN — NICOTINE POLACRILEX 2 MG: 2 LOZENGE ORAL at 13:20

## 2018-01-22 RX ADMIN — GABAPENTIN 200 MG: 100 CAPSULE ORAL at 08:26

## 2018-01-22 RX ADMIN — GABAPENTIN 200 MG: 100 CAPSULE ORAL at 13:23

## 2018-01-22 RX ADMIN — OLANZAPINE 5 MG: 5 TABLET, FILM COATED ORAL at 20:44

## 2018-01-22 ASSESSMENT — ACTIVITIES OF DAILY LIVING (ADL)
DRESS: SCRUBS (BEHAVIORAL HEALTH)
ORAL_HYGIENE: INDEPENDENT
GROOMING: INDEPENDENT
DRESS: SCRUBS (BEHAVIORAL HEALTH);INDEPENDENT
LAUNDRY: WITH SUPERVISION
HYGIENE/GROOMING: INDEPENDENT
LAUNDRY: UNABLE TO COMPLETE
ORAL_HYGIENE: INDEPENDENT

## 2018-01-22 NOTE — PROGRESS NOTES
Patient slept all evening only waking to use the restroom. Patient was not cooperative with staff check in and continued to lay his head down and attempt to sleep upon approach by staff.     01/21/18 2300   Behavioral Health   Hallucinations other (see comment);denies / not responding to hallucinations   Insight denial of illness   Affect blunted, flat   Physical Appearance/Attire disheveled   Hygiene neglected grooming - unclean body, hair, teeth   Suicidality other (see comments)  (none observed or stated)   1. Wish to be Dead No   2. Non-Specific Active Suicidal Thoughts  No   Self Injury other (see comment)  (none stated or observed)   Elopement (none stated or observed)   Activity isolative;withdrawn;other (see comment)  (slept all shift)   Speech clear;coherent   Medication Sensitivity no stated side effects;no observed side effects   Psychomotor / Gait balanced;steady   Overt Aggression Scale   Verbal Aggression 0   Aggression against Property 0   Auto-Aggression 0   Physical Aggression 0   Overt Aggression Total Score 0   Sleep/Rest/Relaxation   Sleep/Rest/Relaxation appears asleep   Day/Evening Time Hours napping   Number of hours napping 7.5 hours   Psycho Education   Type of Intervention 1:1 intervention   Response unavailable   Daily Care   Activity up ad hortencia   Patient Performed Hygiene other (see comments)  (none this shift, sleeping)   Activities of Daily Living   Hygiene/Grooming independent   Oral Hygiene independent   Dress scrubs (behavioral health)   Laundry unable to complete   Room Organization independent   Activity   Activity Assistance Provided independent   Behavioral Health Interventions   Behavioral Disturbance maintain safety precautions;maintain safe secure environment

## 2018-01-22 NOTE — PLAN OF CARE
Problem: General Plan of Care (Inpatient Behavioral)  Goal: Individualization/Patient Specific Goal (IP Behavioral)  The patient and/or their representative will achieve their patient-specific goals related to the plan of care.    The patient-specific goals include:  PPC was not completed, pt refused.

## 2018-01-22 NOTE — PLAN OF CARE
Problem: Patient Care Overview  Goal: Team Discussion  Team Plan:   BEHAVIORAL TEAM DISCUSSION    Participants: Best Toro MD (resident), Jose Stoddard RN, YNES PACHECO, [facility]   Progress: continue to assess  Continued Stay Criteria/Rationale: Pt continues to exhibit symptoms that require inpatient hospitalization   Medical/Physical: see medical chart   Precautions:   Behavioral Orders   Procedures     Assault precautions     Assault precautions     Code 1 - Restrict to Unit     Elopement precautions     Routine Programming     As clinically indicated     Single Room     Status 15     Every 15 minutes.     Suicide precautions     Withdrawal precautions     Plan: conduct initial assessment, meet with psychiatrist and treatment team  Rationale for change in precautions or plan: no change at this time.       Problem: General Plan of Care (Inpatient Behavioral)  Goal: Team Discussion  Team Plan:   BEHAVIORAL TEAM DISCUSSION    Participants: Best Toro MD (resident), Jose Stoddard RN, YNES PACHECO, [facility]   Progress: continue to assess  Continued Stay Criteria/Rationale: Pt continues to exhibit symptoms that require inpatient hospitalization   Medical/Physical: see medical chart   Precautions:   Behavioral Orders   Procedures     Assault precautions     Assault precautions     Code 1 - Restrict to Unit     Elopement precautions     Routine Programming     As clinically indicated     Single Room     Status 15     Every 15 minutes.     Suicide precautions     Withdrawal precautions     Plan: conduct initial assessment, meet with psychiatrist and treatment team  Rationale for change in precautions or plan: no change at this time.

## 2018-01-22 NOTE — PROGRESS NOTES
"[facility], [place]   Psychiatric Progress Note  Hospital Day: 1        Interim History:   The patient's care was discussed with the treatment team during the daily team meeting and/or staff's chart notes were reviewed.  Staff report patient was largely isolative and withdrawn over the weekend, spending much of his time sleeping. He repeatedly asked for his daily dose of methadone; staff members called [place] [facility] to confirm the dose but the clinic was closed. Patient did not have any acute episodes of agitation.    Upon initial approach, patient was still sleeping. Patient was cooperative with interview upon being awakened. Patient reported that he initially came into the hospital because he was feeling a lot of anger and felt like hurting people. He still reports feeling like he is going to hurt people, but does not report feeling anger toward any specific person. He does not feel like he will hurt anybody on the unit. He reported experiencing vague auditory hallucinations around the time of admission but is no longer experiencing them. He also reported experiencing vague thoughts about wanting to kill himself. He reports that he hasn't experienced those before and that it's because \"my life sucks.\" He asked about getting his methadone dose for today and reported that he would like to go to CD treatment. Patient had no other questions/concerns for this writer.         Medications:       methadone  100 mg Oral Once     OLANZapine  5 mg Oral At Bedtime     gabapentin  200 mg Oral TID          Allergies:     Allergies   Allergen Reactions     Amoxicillin           Labs:     Recent Results (from the past 24 hour(s))   CBC with platelets    Collection Time: 01/22/18  9:05 AM   Result Value Ref Range    WBC 4.3 4.0 - 11.0 10e9/L    RBC Count 3.96 (L) 4.4 - 5.9 10e12/L    Hemoglobin 13.3 13.3 - 17.7 g/dL    Hematocrit 40.6 40.0 - 53.0 %     (H) 78 - 100 fl    MCH 33.6 (H) 26.5 - " "33.0 pg    MCHC 32.8 31.5 - 36.5 g/dL    RDW 13.8 10.0 - 15.0 %    Platelet Count 158 150 - 450 10e9/L   Comprehensive metabolic panel    Collection Time: 01/22/18  9:05 AM   Result Value Ref Range    Sodium 140 133 - 144 mmol/L    Potassium 3.5 3.4 - 5.3 mmol/L    Chloride 105 94 - 109 mmol/L    Carbon Dioxide 30 20 - 32 mmol/L    Anion Gap 5 3 - 14 mmol/L    Glucose 128 (H) 70 - 99 mg/dL    Urea Nitrogen 5 (L) 7 - 30 mg/dL    Creatinine 0.58 (L) 0.66 - 1.25 mg/dL    GFR Estimate >90 >60 mL/min/1.7m2    GFR Estimate If Black >90 >60 mL/min/1.7m2    Calcium 8.1 (L) 8.5 - 10.1 mg/dL    Bilirubin Total 0.6 0.2 - 1.3 mg/dL    Albumin 2.9 (L) 3.4 - 5.0 g/dL    Protein Total 5.8 (L) 6.8 - 8.8 g/dL    Alkaline Phosphatase 74 40 - 150 U/L    ALT 15 0 - 70 U/L    AST 14 0 - 45 U/L   TSH with free T4 reflex    Collection Time: 01/22/18  9:05 AM   Result Value Ref Range    TSH 0.90 0.40 - 4.00 mU/L   Lipid panel reflex to direct LDL    Collection Time: 01/22/18  9:05 AM   Result Value Ref Range    Cholesterol 113 <200 mg/dL    Triglycerides 95 <150 mg/dL    HDL Cholesterol 53 >39 mg/dL    LDL Cholesterol Calculated 41 <100 mg/dL    Non HDL Cholesterol 60 <130 mg/dL          Psychiatric Examination:     BP 99/44 (BP Location: Right arm)  Pulse 62  Temp 97.9  F (36.6  C) (Tympanic)  Resp 15  Wt 77.1 kg (170 lb)  SpO2 97%  BMI 25.1 kg/m2  Weight is 170 lbs 0 oz  Body mass index is 25.1 kg/(m^2).                                             Appearance: awake, alert and dressed in hospital scrubs, lying in bed, slightly somnolent  Attitude:  cooperative  Eye Contact:  poor   Mood:  \"life sucks right now\"  Affect:  appropriate and in normal range, mood congruent, intensity is blunted and constricted mobility  Speech:  clear, coherent and normal prosody  Language: fluent and intact in English  Psychomotor, Gait, Musculoskeletal:  no evidence of tardive dyskinesia, dystonia, or tics  Throught Process:  logical and goal " oriented  Associations:  no loose associations  Thought Content:  no evidence of suicidal ideation or homicidal ideation and no evidence of psychotic thought  Insight:  fair  Judgement:  limited  Oriented to:  time, person, and place  Attention Span and Concentration:  fair  Recent and Remote Memory:  fair  Fund of Knowledge:  average         Precautions:     Behavioral Orders   Procedures     Assault precautions     Assault precautions     Code 1 - Restrict to Unit     Elopement precautions     Routine Programming     As clinically indicated     Single Room     Status 15     Every 15 minutes.     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 bipolar disorder w/ psychotic features)  Stimulant Use Disorder, methamphetamine type  Alcohol Use Disorder  Cannabis Use Disorder  Opiate Use Disorder  Tobacco Use Disorder         Assessment & Plan:   Assessment:  33 y/o male with previous psychiatric history significant for multiple diagnoses, including schizoaffective, bipolar type and substance-induced psychosis, along with severe polysubstance abuse (methamphetamines, cannabis, opiates, tobacco) who presented to [facility] ED w/ psychotic symptoms (auditory hallucinations) and agitation in the context of recent methamphetamine usage and multiple altercations with police officers. Patient also described vague thoughts about killing himself. He reported disrupted sleep and medication noncompliance for several months. Following admission, patient has largely been isolative and perseverative on receiving his next dose of methadone; his utox was negative and per staff report has not been experiencing opiate withdrawal symptoms. Patient's methadone dose of 100 mg was confirmed with [place] methadone clinic; he was last there on Friday, 1/19. Patient reports that psychotic symptoms and SI have largely resolved since admission to the hospital and scheduled  nighttime Zyprexa. Given patient report of IV methamphetamine usage every 1-2 days and improvement since cessation of drug use, patient's psychotic symptoms likely substance-induced. Will plan to provide patient with regular methadone dose today with plan for discharge tomorrow assuming continued improvement. Hospitalization necessary for med management, stabilization, and safety.    Medications:  - Methadone 100 mg one time dose  - Continue Neurontin 200 mg TID  - Continue Zyprexa 5 mg qHS    Medical Problems and Treatments:  #Asthma  - Albuterol PRN  #Opiate Withdrawal  - D/C opiate withdrawal scale; patient not scoring and received methadone today    Labs:  - CBC/CMP WNL  - Utox positive for amphetamines; negative for opiates    Behavioral/Psychological/Social:  - Status 15  - Assault/elopement/suicide/withdrawal precautions    Legal:  - Voluntary    Disposition:  - After discussion with patient, likely discharge tomorrow. Patient will receive methadone dose today and discharge tomorrow to go to methadone clinic for Tuesday dose.    The patient was discussed with Dr. Paredes, who agrees with this assessment and plan.    Vlad Bolden MD  PGY-2 Psychiatry Resident

## 2018-01-22 NOTE — PROGRESS NOTES
01/22/18 1500   Behavioral Health   Hallucinations other (see comment)   Thinking other (see comment)   Orientation other (see comment)   Memory other (see comment)   Insight poor   Judgement impaired   Eye Contact at examiner   Affect irritable   Mood labile;irritable   Activities of Daily Living   Hygiene/Grooming independent   Oral Hygiene independent   Dress scrubs (behavioral health)   Laundry unable to complete   Room Organization independent   Patient was in his room sleeping for most of this shift. He came out of his room to take his medication and went back to his room soon after. He ate all meals without any issues, and he is currently in his room sleeping.

## 2018-01-23 PROCEDURE — 12400003 ZZH R&B MH CRITICAL UMMC

## 2018-01-23 PROCEDURE — 25000132 ZZH RX MED GY IP 250 OP 250 PS 637: Performed by: NURSE PRACTITIONER

## 2018-01-23 PROCEDURE — 25000132 ZZH RX MED GY IP 250 OP 250 PS 637: Performed by: INTERNAL MEDICINE

## 2018-01-23 PROCEDURE — 99232 SBSQ HOSP IP/OBS MODERATE 35: CPT | Mod: GC | Performed by: PSYCHIATRY & NEUROLOGY

## 2018-01-23 PROCEDURE — 25000132 ZZH RX MED GY IP 250 OP 250 PS 637: Performed by: STUDENT IN AN ORGANIZED HEALTH CARE EDUCATION/TRAINING PROGRAM

## 2018-01-23 RX ORDER — NALOXONE HYDROCHLORIDE 0.4 MG/ML
.1-.4 INJECTION, SOLUTION INTRAMUSCULAR; INTRAVENOUS; SUBCUTANEOUS
Status: DISCONTINUED | OUTPATIENT
Start: 2018-01-23 | End: 2018-01-26 | Stop reason: HOSPADM

## 2018-01-23 RX ORDER — METHADONE HYDROCHLORIDE 5 MG/5ML
100 SOLUTION ORAL DAILY
Status: DISCONTINUED | OUTPATIENT
Start: 2018-01-23 | End: 2018-01-26 | Stop reason: HOSPADM

## 2018-01-23 RX ORDER — OLANZAPINE 5 MG/1
5 TABLET ORAL AT BEDTIME
Qty: 30 TABLET | Refills: 0 | Status: SHIPPED | OUTPATIENT
Start: 2018-01-23 | End: 2018-02-22

## 2018-01-23 RX ORDER — FLUOXETINE 10 MG/1
10 CAPSULE ORAL DAILY
Status: DISCONTINUED | OUTPATIENT
Start: 2018-01-23 | End: 2018-01-26 | Stop reason: HOSPADM

## 2018-01-23 RX ORDER — GABAPENTIN 100 MG/1
200 CAPSULE ORAL 3 TIMES DAILY
Qty: 180 CAPSULE | Refills: 0 | Status: SHIPPED | OUTPATIENT
Start: 2018-01-23 | End: 2018-01-25

## 2018-01-23 RX ADMIN — ACETAMINOPHEN 650 MG: 325 TABLET, FILM COATED ORAL at 13:01

## 2018-01-23 RX ADMIN — GABAPENTIN 200 MG: 100 CAPSULE ORAL at 13:01

## 2018-01-23 RX ADMIN — NICOTINE POLACRILEX 4 MG: 2 LOZENGE ORAL at 16:12

## 2018-01-23 RX ADMIN — NICOTINE POLACRILEX 4 MG: 2 LOZENGE ORAL at 13:01

## 2018-01-23 RX ADMIN — HYDROXYZINE HYDROCHLORIDE 50 MG: 25 TABLET ORAL at 14:04

## 2018-01-23 RX ADMIN — GABAPENTIN 200 MG: 100 CAPSULE ORAL at 08:50

## 2018-01-23 RX ADMIN — FLUOXETINE 10 MG: 10 CAPSULE ORAL at 11:57

## 2018-01-23 RX ADMIN — NICOTINE POLACRILEX 4 MG: 2 LOZENGE ORAL at 18:33

## 2018-01-23 RX ADMIN — MAGNESIUM HYDROXIDE 30 ML: 400 SUSPENSION ORAL at 14:04

## 2018-01-23 RX ADMIN — GABAPENTIN 200 MG: 100 CAPSULE ORAL at 19:46

## 2018-01-23 RX ADMIN — IBUPROFEN 600 MG: 600 TABLET ORAL at 06:53

## 2018-01-23 RX ADMIN — ALUMINUM HYDROXIDE, MAGNESIUM HYDROXIDE, AND DIMETHICONE 30 ML: 400; 400; 40 SUSPENSION ORAL at 19:10

## 2018-01-23 RX ADMIN — OLANZAPINE 5 MG: 5 TABLET, FILM COATED ORAL at 19:46

## 2018-01-23 RX ADMIN — METHADONE HYDROCHLORIDE 100 MG: 5 SOLUTION ORAL at 12:45

## 2018-01-23 ASSESSMENT — ACTIVITIES OF DAILY LIVING (ADL)
GROOMING: INDEPENDENT
ORAL_HYGIENE: INDEPENDENT
DRESS: INDEPENDENT
GROOMING: SHOWER;INDEPENDENT
ORAL_HYGIENE: INDEPENDENT
DRESS: SCRUBS (BEHAVIORAL HEALTH)
GROOMING: INDEPENDENT
LAUNDRY: WITH SUPERVISION
ORAL_HYGIENE: INDEPENDENT
DRESS: SCRUBS (BEHAVIORAL HEALTH)

## 2018-01-23 NOTE — PROGRESS NOTES
Patient is reporting some thought of SI/SIB so will not be discharged today.  The Dr is looking at transfer to another unit.  The unit has not heard at this time if another unit has accepted him or not.  He has spent most of the shift in his room.  He did complain of sore feet. They are clearly very dry.  It was recommended that he soak his feet then apply lotion.  He agreed to that plan.  Patient did request a PRN of hydroxyzine after there had been peers yelling and screaming for an extended period of time.   Patient did ask to speak with someone who could talk to him about options for support after discharge.  He was referred to the [facility].

## 2018-01-23 NOTE — PLAN OF CARE
"Problem: Behavioral Disturbance  Goal: Behavioral Disturbance  Signs and symptoms of listed problems will be absent or manageable by discharge or transition of care.   Outcome: Improving  48 hour nursing assessment:    Pt isolative most of the shift.  Pt out in the milieu for dinner and some ambulation.  Pt reports feeling depressed and anxious. Hydroxyzine 50 mg administered for anxiety.  Pt denies SI/SIB, HI, and hallucinations.  Pt stated, \"I am tired\".  Med compliant.  Ate well at dinner.        "

## 2018-01-23 NOTE — PROGRESS NOTES
"[facility], [place]   Psychiatric Progress Note  Hospital Day: 2        Interim History:   The patient's care was discussed with the treatment team during the daily team meeting and/or staff's chart notes were reviewed.  Staff report patient was withdrawn to room and sleeping for most of the day. Patient described some increased swelling on hand this morning.    Upon interview, patient reports that he doesn't feel ready for discharge. He endorses ongoing low mood and intermittent thoughts about self-harm/suicide. He denies ongoing auditory hallucinations. He reports feeling down about his difficult life circumstances including current housing situation. He expresses desire to go to a CD treatment facility. He denies any more withdrawal symptoms after receiving methadone dose yesterday. He feels his sleep is improving. He is agreeable to undergoing Rule 25 assessment for CD funding. He is agreeable to starting anti-depressant to address depression symptoms. Patient had no other questions/concerns.         Medications:       FLUoxetine  10 mg Oral Daily     methadone  100 mg Oral Daily     OLANZapine  5 mg Oral At Bedtime     gabapentin  200 mg Oral TID          Allergies:     Allergies   Allergen Reactions     Amoxicillin           Labs:     No results found for this or any previous visit (from the past 24 hour(s)).       Psychiatric Examination:     /57  Pulse 58  Temp 98.1  F (36.7  C) (Tympanic)  Resp 16  Wt 77.1 kg (170 lb)  SpO2 97%  BMI 25.1 kg/m2  Weight is 170 lbs 0 oz  Body mass index is 25.1 kg/(m^2).                                             Appearance: awake, alert and dressed in hospital scrubs, lying in bed, somnolent  Attitude:  cooperative  Eye Contact:  poor   Mood:  \"I'm still depressed\"  Affect:  appropriate and in normal range, mood congruent, intensity is blunted and constricted mobility  Speech:  clear, coherent and normal prosody  Language: fluent and " intact in English  Psychomotor, Gait, Musculoskeletal:  no evidence of tardive dyskinesia, dystonia, or tics  Throught Process:  logical and goal oriented  Associations:  no loose associations  Thought Content:  no evidence of suicidal ideation or homicidal ideation and no evidence of psychotic thought  Insight:  fair  Judgement:  limited  Oriented to:  time, person, and place  Attention Span and Concentration:  fair  Recent and Remote Memory:  fair  Fund of Knowledge:  average         Precautions:     Behavioral Orders   Procedures     Assault precautions     Assault precautions     Code 1 - Restrict to Unit     Elopement precautions     Routine Programming     As clinically indicated     Single Room     Status 15     Every 15 minutes.     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 bipolar disorder w/ psychotic features)  Stimulant Use Disorder, methamphetamine type  Alcohol Use Disorder  Cannabis Use Disorder  Opiate Use Disorder  Tobacco Use Disorder         Assessment & Plan:   Assessment:  33 y/o male with previous psychiatric history significant for multiple diagnoses, including schizoaffective, bipolar type and substance-induced psychosis, along with severe polysubstance abuse (methamphetamines, cannabis, opiates, tobacco) who presented to [facility] ED w/ psychotic symptoms (auditory hallucinations) and agitation in the context of recent methamphetamine usage and multiple altercations with police officers. Patient also described vague thoughts about killing himself. He reported disrupted sleep and medication noncompliance for several months. Following admission, patient has largely been isolative and perseverative on receiving his next dose of methadone; his utox was negative and per staff report has not been experiencing opiate withdrawal symptoms. Patient's methadone dose of 100 mg was confirmed with [place] methadone clinic; he was  last there on Friday, 1/19. Patient reports that psychotic symptoms and SI have largely resolved since admission to the hospital and scheduled nighttime Zyprexa. Given patient report of IV methamphetamine usage every 1-2 days and improvement since cessation of drug use, patient's psychotic symptoms likely substance-induced. Will plan to provide patient with regular methadone dose today with plan for discharge tomorrow assuming continued improvement. Hospitalization necessary for med management, stabilization, and safety.    Medications:  - Start Prozac 10 mg daily  - Continue Methadone 100 mg qday  - Continue Neurontin 200 mg TID  - Continue Zyprexa 5 mg qHS    Medical Problems and Treatments:  #Asthma  - Albuterol PRN  #Opiate Withdrawal  - opiate withdrawal scale D/C'd    Labs:  - CBC/CMP WNL  - Utox positive for amphetamines; negative for opiates    Behavioral/Psychological/Social:  - Status 15  - Assault/elopement/suicide/withdrawal precautions    Legal:  - Voluntary    Disposition:  - Patient endorsing continued depressive symptoms and desire to go to CD treatment. Will plan to have patient undergo Rule 25 assessment and look to transfer to other unit due to lower acuity.    The patient was discussed with Dr. Paredes, who agrees with this assessment and plan.    Vlad Bolden MD  PGY-2 Psychiatry Resident

## 2018-01-23 NOTE — PROGRESS NOTES
Pt up early and c/o R hand edema and pain.  Pt had punched a wall on 1-20-18, x ray negative for fracture/dislocation.  Ice pack and Ibuprofen 600 mg administered.  Will report to next shift.

## 2018-01-23 NOTE — PROGRESS NOTES
This writer provided the pt with the Rule 25 paperwork to work on this evening.  We are hoping to have an in-house  complete the Rule 25 tomorrow.  He told me that he did NOT want to go to [facility] or [facility].

## 2018-01-24 PROCEDURE — 25000132 ZZH RX MED GY IP 250 OP 250 PS 637: Performed by: STUDENT IN AN ORGANIZED HEALTH CARE EDUCATION/TRAINING PROGRAM

## 2018-01-24 PROCEDURE — 25000132 ZZH RX MED GY IP 250 OP 250 PS 637: Performed by: INTERNAL MEDICINE

## 2018-01-24 PROCEDURE — 12400003 ZZH R&B MH CRITICAL UMMC

## 2018-01-24 PROCEDURE — 25000132 ZZH RX MED GY IP 250 OP 250 PS 637: Performed by: NURSE PRACTITIONER

## 2018-01-24 PROCEDURE — 99232 SBSQ HOSP IP/OBS MODERATE 35: CPT | Mod: GC | Performed by: PSYCHIATRY & NEUROLOGY

## 2018-01-24 PROCEDURE — 25000132 ZZH RX MED GY IP 250 OP 250 PS 637: Performed by: PSYCHIATRY & NEUROLOGY

## 2018-01-24 RX ORDER — GABAPENTIN 300 MG/1
300 CAPSULE ORAL 3 TIMES DAILY
Status: DISCONTINUED | OUTPATIENT
Start: 2018-01-24 | End: 2018-01-26 | Stop reason: HOSPADM

## 2018-01-24 RX ADMIN — NICOTINE POLACRILEX 4 MG: 2 GUM, CHEWING ORAL at 14:32

## 2018-01-24 RX ADMIN — OLANZAPINE 5 MG: 5 TABLET, FILM COATED ORAL at 21:23

## 2018-01-24 RX ADMIN — NICOTINE POLACRILEX 4 MG: 2 LOZENGE ORAL at 08:08

## 2018-01-24 RX ADMIN — NICOTINE POLACRILEX 4 MG: 2 GUM, CHEWING ORAL at 12:38

## 2018-01-24 RX ADMIN — GABAPENTIN 300 MG: 300 CAPSULE ORAL at 14:24

## 2018-01-24 RX ADMIN — FLUOXETINE 10 MG: 10 CAPSULE ORAL at 08:08

## 2018-01-24 RX ADMIN — GABAPENTIN 200 MG: 100 CAPSULE ORAL at 08:08

## 2018-01-24 RX ADMIN — HYDROXYZINE HYDROCHLORIDE 25 MG: 25 TABLET ORAL at 22:25

## 2018-01-24 RX ADMIN — GABAPENTIN 300 MG: 300 CAPSULE ORAL at 21:23

## 2018-01-24 RX ADMIN — NICOTINE POLACRILEX 4 MG: 2 GUM, CHEWING ORAL at 16:05

## 2018-01-24 RX ADMIN — METHADONE HYDROCHLORIDE 100 MG: 5 SOLUTION ORAL at 08:08

## 2018-01-24 RX ADMIN — NICOTINE POLACRILEX 4 MG: 2 GUM, CHEWING ORAL at 18:13

## 2018-01-24 RX ADMIN — HYDROXYZINE HYDROCHLORIDE 50 MG: 25 TABLET ORAL at 16:05

## 2018-01-24 RX ADMIN — MAGNESIUM HYDROXIDE 30 ML: 400 SUSPENSION ORAL at 16:05

## 2018-01-24 RX ADMIN — NICOTINE POLACRILEX 4 MG: 2 GUM, CHEWING ORAL at 21:23

## 2018-01-24 ASSESSMENT — ACTIVITIES OF DAILY LIVING (ADL)
ORAL_HYGIENE: INDEPENDENT
LAUNDRY: UNABLE TO COMPLETE
DRESS: SCRUBS (BEHAVIORAL HEALTH)
DRESS: SCRUBS (BEHAVIORAL HEALTH)
LAUNDRY: UNABLE TO COMPLETE
GROOMING: HANDWASHING;SHOWER;INDEPENDENT
ORAL_HYGIENE: INDEPENDENT
HYGIENE/GROOMING: INDEPENDENT

## 2018-01-24 NOTE — PROGRESS NOTES
Pt was withdrawn to his room for the start of the shift, but did emerge and socialize with staff and peers toward the end of the shift. Pt had some poor boundaries with peer, N.O., but was redirectable. Pt spent time pacing and fixated on receiving snack on time. Pt has pressured speech and difficulty staying on topic in conversation.          01/23/18 6841   Behavioral Health   Hallucinations denies / not responding to hallucinations   Thinking poor concentration;confused   Orientation person: oriented;place: oriented   Memory baseline memory   Insight poor   Judgement impaired   Eye Contact at examiner   Affect blunted, flat   Mood mood is calm;depressed   Physical Appearance/Attire attire appropriate to age and situation   Hygiene well groomed   Suicidality other (see comments)  (None stated, none observed)   1. Wish to be Dead No   2. Non-Specific Active Suicidal Thoughts  No   Self Injury other (see comment)  (None stated, none observed)   Elopement (none stated, none observed)   Activity withdrawn   Speech coherent;clear   Activities of Daily Living   Hygiene/Grooming independent   Oral Hygiene independent   Dress scrubs (behavioral health)   Room Organization independent   Activity   Activity Assistance Provided independent

## 2018-01-24 NOTE — PROGRESS NOTES
Case Management Note    Met with the patient to complete Rule 25 assessment. Patient participated collaboratively in the assessment.  He verbalized desire to stay in the hospital until the first so that his GA could come in and he could get a haircur.   He was assessed at the residential level.  He verbalizes desire for treatment to ameliorate the discomfort of homelessness.  He signed all documentation for [facility] and [facility] for [facility] and [facility].  Ximena has informed the [facility] that he is NOT welcome to return to their program.  The patient's documentation was sent to the clinical review team at [facility] ([phone]/fax [phone]) and Intake/admissions at [facility] ([phone]/fax [phone]).  [facility] was informed.      This writer (Renetta Grover) is amending the note:  I provided mis-information to Lucia Encarnacion earlier today about [facility].  [facility] would consider this patient.  If we have trouble getting [facility] to consider him we will follow-up with X[facility].

## 2018-01-24 NOTE — PROGRESS NOTES
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 practices. Include time spent obtaining, using, recovering from drugs or alcohol. (DSM)  I don't do much of anything except for using and I lack daily structure.      2B. How often do you spend more time than you planned using or use more than you planned? (DSM)   All the time when I am actively using.     3. How important is using to your social connections? Do many of your family or friends use?   Most of my friends use but it's not important to me anymore.     4A. Are you currently in a significant relationship? No    4C. Sexual Orientation: Heterosexual    5A. Who do you live with?  I am homeless    5B. Tell me about their alcohol/drug use and mental health issues. NA    5C. Are you concerned for your safety there? yes.    5D. Are you concerned about the safety of anyone else who lives with you? no.    6A. Do you have children who live with you? No    6B. Do you have children who do not live with you? Yes. I have an 11 year old daughter that does not live with me    7A. Who supports you in making changes in your alcohol or drug use? What are they willing to do to support you? Who is upset or angry about you making changes in your alcohol or drug use? How big a problem is this for you?  I'm sure someone  would help if I needed something and if they knew I was working hard on my sobriety.     7B. This table is provided to record information about the person s relationships and available support It is not necessary to ask each item; only to get a comprehensive picture of their support system.  How often can you count on the following people when you need someone?   Partner / Spouse N/A   Parent(s)/Aunt(s)/Uncle(s)/Grandparents Usually supportive   Sibling(s)/Cousin(s) Usually supportive   Child(tabitha) Usually supportive   Other relative(s) Usually supportive   Friend(s)/neighbor(s) N/A   Child(tabitha) s father(s)/mother(s) N/A   Support group member(s) N/A   Community of shelly members N/A   /counselor/therapist/healer N/A   Other (specify) N/A     8A. What is your current living situation? I am currently homeless    8B. What is your long term plan for where you will be living? I would like to live independently    8C. Tell me about your living environment/neighborhood?  I am homeless.  It is stressful and dangerous    9. Criminal justice history: Gather current/recent history and any significant history related to substance use--Arrests? Convictions? Circumstances? Alcohol or drug involvement? Sentences? Still on probation or parole? Expectations of the court? Current court order? Any sex offenses - lifetime? What level? (DSM)    None currently.  In the past I was incarcerated for a number of different offenses (4x) - robbery, theft, possession, etc.    10. What obstacles exist to participating in treatment? (Time off work, childcare, funding, transportation, pending assisted time, living situation)   None    Dimension VI Ratings   Recovery environment - The placing authority must use the criteria in Dimension VI to determine a client s recovery environment.   RISK DESCRIPTIONS - Severity ratin Client has (A) Chronically antagonistic significant other, living environment, family, peer group or long-term  criminal justice involvement that is harmful to recovery or treatment progress, or (B) Client has an actively antagonistic significant other, family, work, or living environment with immediate threat to the client s safety and well-being.    REASONS SEVERITY WAS ASSIGNED Patient reports that his current living situation is unsupportive towards his recovery. Pt reports that he is currently homeless. Pt reports that he lacks a daily structure and meaningful activities. Pt reports that he is currently unemployed and is not attending school at this time. Pt reports fracturing relationships with family and friends due to his use.  Pt lacks a sober support network. Pt reports that he has some past legal involvement for Robbery, theft, possession and isn't on probation for it.        Client Choice/Exceptions   Would you like services specific to language, age, gender, culture, Restorationist preference, race, ethnicity, sexual orientation or disability?  No    What particular treatment choices and options would you like to have? Residential Treatment    Do you have a preference for a particular treatment program? [facility] or [facility]    Criteria for Diagnosis     Criteria for Diagnosis  DSM-5 Criteria for Substance Use Disorder  Instructions: Determine whether the client currently meets the criteria for Substance Use Disorder using the diagnostic criteria in the DSM-V pp.481-589. Current means during the most recent 12 months outside a facility that controls access to substances    Category of Substance Severity (ICD-10 Code / DSM 5 Code)     Alcohol Use Disorder NA   Cannabis Use Disorder NA   Hallucinogen Use Disorder NA   Inhalant Use Disorder NA   Opioid Use Disorder NA   Sedative, Hypnotic, or Anxiolytic Use Disorder NA   Stimulant Related Disorder Severe   (F15.20) (304.40) Amphetamine type substance   Tobacco Use Disorder Moderate   (F17.200) (305.1)   Other (or unknown) Substance Use Disorder NA      Collateral Contact Summary   Number of contacts made: 2    Contact with referring person:  N/A.    If court related records were reviewed, summarize here: N/A    Information from collateral contacts supported/largely agreed with information from the client and associated risk ratings.    Rule 25 Assessment Summary and Plan   's Recommendation    1)  Complete a residential based treatment program similar to [facility] or [facility].     The patient has been in a cycle of use, treatment or incarceration, followed by brief periods of abstinence followed by relapse for nearly 20 years.  He verbalizes desire for treatment in an effort to relieve the discomfort of homelessness at this time.      2)  Abstain from all mood-altering chemicals unless prescribed by a licensed provider.   3)  Attend weekly peer support (12-step, health realization, SMART Recovery, All-Recovery, Wellbriety, etc.) support group meetings.     4)  Actively work with a male recovery mentor/sponsor and/or obtain a  through [place] [employer] ([phone]).   5)  Follow all the recommendations of your treatment/medical providers.  6)  Remain law abiding and follow all recommendations of the Courts/PO.      Collateral Contacts     Name    Estela Moreno Relationship    Attending Physician Phone Number    [phone] Releases           Patient's H&P follows:  Estela Joel APRN CNP Nurse Practitioner Signed Psychiatry H&P   Date of Service: 1/21/2018  8:55 AM Creation Time: 1/21/2018 10:38 AM         []Hide copied text  []Shirley for attribution information  IDENTIFYING INFORMATION:  Mr. Moisés Cohn is a 32-year-old male admitted to the [facility] [place] [address].  He was admitted as a voluntary patient through the emergency department due to mood instability, agitation and psychosis.  He has a history of abusing a variety of substances.   "Methamphetamine is his drug of choice and U-tox was positive for amphetamines.       CHIEF COMPLAINT:  \"Really depressed.\"       HISTORY OF PRESENT ILLNESS:  Mr. Cohn provides minimal information for this assessment.  He was somnolent and agitated, and requested that the conversation be brief.         INTAKE DATA:  Records from the ER and records from his previous hospitalization were reviewed.       Mr. Cohn has previous diagnoses of depression versus bipolar disorder, generalized anxiety disorder, alcohol use disorder, cannabis use disorder, stimulant (methamphetamine) use disorder and opiate use disorder.  He states that he is homeless and stays \"anywhere,\" mostly outside.  He has not been taking medications recently and said that in general, \"I don't really take meds.\"  The patient states that he is on methadone maintenance 100 mg daily through [facility].  He says that his last dose was on Friday and that he does not have possession of his takeout doses for the weekend.  [facility] was closed and we have been unable to verify this.  U-tox did not show the presence of opiates.  He reports that he has been using IV methamphetamine \"every couple days\" with last use 2 days ago.  He currently reports that he last consumed alcohol two days ago and does not generally drink in excess.  He has been struggling with anger and mood instability.  He has had verbal and physical altercations including with members of the police force.  He recently punched a wall and has a swollen right hand.  X-ray showed no evidence of fracture.  He was voluntarily hospitalized on station [address] [place].  He refused to participate in the RN admission assessment, stating \"I have auditory hallucinations and I'm violent as fuck.\"   He was minimally cooperative with today's assessment.  He states that he is considering the possibility of CD treatment.       During the present assessment, the patient reports that he is \"hearing voices and " "really stressed out and really violent.\"  He is not sure what the auditory hallucinations say.  He denies visual hallucinations.  He endorses paranoid thought content that people want to harm him.  He believes that others can read his mind.  He denies receiving messages from the TV or radio.  His sleep and appetite are \"okay.\"  He continues to endorse thoughts of harming others, but nobody specifically.  He denies homicidal ideation.  He endorses racing thoughts and anxiety.        PAST PSYCHIATRIC HISTORY:  The patient reports a history of 2 prior psychiatric hospitalizations.  He was hospitalized at [facility] in 2012 and was discharged AMA after he eloped from a pass for Rule 25.  He has a history of suicide attempt in 2012 by shooting up meth.  He does not have a .  His psychiatrist is Dr. Vela at [facility].  ER notes indicate he saw his psychiatrist 2 days ago; however, he informed me he last saw him 1-2 months ago.  In the past he took Effexor, which caused \"electric jolts.\"  He also took BuSpar for a short period of time and it was ineffective.  He has also taken Wellbutrin; other people noticed that his mood appeared improved, but he did not notice a difference.  He says that Xanax was helpful in the past.  He has also taken Zoloft, lithium, Risperdal and Paxil.  He states that only Zyprexa and Neurontin are beneficial for him.  He denies any history of commitment.       SUBSTANCE USE HISTORY:  He began using marijuana and methamphetamine at age 13.  His use of methamphetamine has progressed and he now uses IV meth several days per week.  He reports occasional use of alcohol and denies drinking in excess recently.  In the past he has consumed up to 1 liter of hard liquor daily.  He has abused a variety of prescription medications including Vicodin, Percocet, methadone and Klonopin.  He says that he is on methadone maintenance through [place], but this was unable to be confirmed.  He smokes 1/2 " "pack of cigarettes per day.  He states that he has been to CD treatment in the past.  He had a DUI around the age of 17.       PAST MEDICAL HISTORY:   1.  Asthma.   2.  Rhinoplasty.        No history of seizures.       REVIEW OF SYSTEMS:  He endorses right hand pain as a result of punching a wall.  He also has right foot pain and a blister on the bottom of his right foot.  A 10-point review of systems was completed and is otherwise negative with the exception of HPI.       PHYSICAL EXAMINATION:  Please refer to the exam completed by Dr. Govea in the emergency department 01/20/2018.       PRIOR TO ADMISSION MEDICATIONS:  Albuterol 2 puffs inhaled every 4 hours as needed for shortness of breath.       ALLERGIES:  AMOXICILLIN.       LABORATORY DATA:  Urine toxicology was positive for amphetamines.  Other laboratory data are pending collection.       VITAL SIGNS:  Temperature 96.8, blood pressure 119/45, respirations 16, pulse 99.       FAMILY HISTORY:  Per records, he says that several members of his family are \"high strung\" but denies any known history of mental illness.  Records indicate it is unclear whether his mother was addicted to Xanax and/or methadone.  However, currently he is denying any family history of chemical dependency.  No family history of suicide.       SOCIAL HISTORY:  Per records, he was raised by his mother and had minimal contact with his father.  His childhood was \"okay, stressful\" due to his \"high-strung family.\" Highest level of education is a GED.  When he was 17, he was in prison for auto theft.  He states that he is not currently on probation and does not have any recent legal issues.  During the present assessment he said he has never been  and has no children.  However, during his assessment in 2012, he reported that he did have 1 child with his former girlfriend and that the child was in foster care.  In the past he worked at [employer] for a couple years.  He has also " "done warehouse work.  He states that he has not worked in \"years.\"  He is homeless.       MENTAL STATUS EXAMINATION:  He was awake, somnolent, disheveled.  Attitude was somewhat cooperative.  He made no eye contact.  He said that his mood is irritable, agitated, anxious and depressed.  He currently denies suicidal thoughts.  He has auditory hallucinations.  He has thoughts of harming others without a specific target.  He has paranoid thought content that other people will harm him.  Insight was limited.  Judgment was limited.  He was oriented to person, place, month and year.  Attention span and concentration were impaired.  Recent and remote memory were somewhat impaired.  He had no peculiar use of language.  Fund of knowledge was appropriate.  Muscle strength and tone were normal.  Gait and station were normal.       DIAGNOSES:   1.  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   2.  Stimulant use disorder, severe.   3.  History of alcohol use disorder.   3.  History of cannabis use disorder.   4.  History of opiate use disorder.   5.  Nicotine use disorder.   6.  Asthma.       RECOMMENDATIONS:  Mr. Cohn will continue as a voluntary patient on station 12 [place].  His care will be assumed by Dr. Paredes on Monday.  We will encourage him to be involved in unit activities as he is able.  We discussed options for medication management.  He requested Zyprexa 5 mg at bedtime.  He refuses a higher dose because he took 10 mg last night and feels somnolent today.  Zyprexa is also available as needed.  Scheduled Neurontin will be initiated.  Trazodone is available as needed for insomnia.  He is on the opiate withdrawal scale with vital signs as he states he is on Methadone 100 mg daily through [facility]; however, this dose is unable to be confirmed during the weekend.  For the present time, he will have comfort medications " including clonidine, Zofran, Imodium and ibuprofen available if he does experience opiate withdrawal symptoms.  He states that he is interested in CD treatment.  He does have an outpatient psychiatrist.  I provided him with information regarding the risks and benefits of this treatment plan including medications, and he provided consent.           CHAITANYA JOEL NP                D: 2018 08:55   T: 2018 10:08   MT: CC       Name:     AMEE DOBBINS   MRN:      [id number]        Account:      [id number]   :      1985           Admitted:     [id number]       Document: [id number]               Last signed by: Chaitanya Joel APRN CNP at 2018 11:53 AM         Collateral Contacts     Name    [facility]'s EMR   Relationship     Phone Number     Releases           Information Provided:  This writer reviewed this patients Electronic Medical Record and the information reviewed largely supports the information the patient reported during their CD evaluation.    llateral Contacts      A problematic pattern of alcohol/drug use leading to clinically significant impairment or distress, as manifested by at least two of the following, occurring within a 12-month period:    There is a persistent desire or unsuccessful efforts to cut down or control alcohol/drug use  A great deal of time is spent in activities necessary to obtain alcohol, use alcohol, or recover from its effects.  Craving, or a strong desire or urge to use alcohol/drug  Recurrent alcohol/drug use resulting in a failure to fulfill major role obligations at work, school or home.  Continued alcohol use despite having persistent or recurrent social or interpersonal problems caused or exacerbated by the effects of alcohol/drug.  Important social, occupational, or recreational activities are given up or reduced because of alcohol/drug use.  Recurrent alcohol/drug use in situations in which it is physically  hazardous.  Alcohol/drug use is continued despite knowledge of having a persistent or recurrent physical or psychological problem that is likely to have been caused or exacerbated by alcohol.  Withdrawal, as manifested by either of the following: The characteristic withdrawal syndrome for alcohol/drug (refer to Criteria A and B of the criteria set for alcohol/drug withdrawal).      Specify if:  Severe: Presence of 6 or more symptoms

## 2018-01-24 NOTE — PROGRESS NOTES
Pt denies A/V hallucinations. But stated that he is depressed. States his depression at 8/10 and anxiety at 10/10. States he is worried about where to go when he is discharged d/t him being homeless. States he has thoughts about suicide and a plan outside of the hospital to jump off bridge but contracts for safety in the hospital and states he feels safe here. States he wants to talk with  and  about his placement for the future. Pt was med complaint and cooperative.

## 2018-01-24 NOTE — PROGRESS NOTES
01/24/18 1514   Behavioral Health   Hallucinations denies / not responding to hallucinations   Thinking confused;poor concentration   Orientation person: oriented;place: oriented   Memory baseline memory   Insight poor   Judgement impaired   Eye Contact at examiner   Affect blunted, flat   Mood mood is calm   Physical Appearance/Attire attire appropriate to age and situation   Hygiene well groomed   Speech clear;coherent   Activities of Daily Living   Hygiene/Grooming independent   Oral Hygiene independent   Dress scrubs (behavioral health)   Laundry unable to complete   Room Organization independent     PT. Was calm and pleasant for most of the shift. Pt took a shower and went to groups. Pt. Was social with peers. PT. Ate breakfast and lunch.

## 2018-01-24 NOTE — PROGRESS NOTES
"[facility], [place]   Psychiatric Progress Note  Hospital Day: 3        Interim History:   The patient's care was discussed with the treatment team during the daily team meeting and/or staff's chart notes were reviewed.  Staff report was more social yesterday and continued to report vague thoughts of SI/depression. No acute events overnight.    Upon interview, patient continues to endorse desire to go to CD treatment. He mentioned wanting to look into getting some type of outpatient  because of his living situation. He is endorsing similar vague depressive symptoms as he did yesterday during our interview. He endorsed increased anxiety and requested nicotine PRNs and an increase in his Neurontin dosage. He continues to endorse improved sleep. He denies medication side effects. Withdrawal symptoms have resolved since administration of methadone. Patient had no other questions for this writer.         Medications:       gabapentin  300 mg Oral TID     FLUoxetine  10 mg Oral Daily     methadone  100 mg Oral Daily     OLANZapine  5 mg Oral At Bedtime          Allergies:     Allergies   Allergen Reactions     Amoxicillin           Labs:     No results found for this or any previous visit (from the past 24 hour(s)).       Psychiatric Examination:     /57  Pulse 58  Temp 97.5  F (36.4  C) (Tympanic)  Resp 16  Wt 77.1 kg (170 lb)  SpO2 97%  BMI 25.1 kg/m2  Weight is 170 lbs 0 oz  Body mass index is 25.1 kg/(m^2).                                             Appearance: awake, alert and dressed in hospital scrubs, sitting up in bed  Attitude:  cooperative  Eye Contact:  poor   Mood:  \"symptoms are the same\"  Affect:  appropriate and in normal range, mood congruent, intensity is blunted and constricted mobility  Speech:  clear, coherent and normal prosody  Language: fluent and intact in English  Psychomotor, Gait, Musculoskeletal:  no evidence of tardive dyskinesia, " dystonia, or tics  Throught Process:  logical and goal oriented  Associations:  no loose associations  Thought Content:  no evidence of suicidal ideation or homicidal ideation and no evidence of psychotic thought  Insight:  fair  Judgement:  limited  Oriented to:  time, person, and place  Attention Span and Concentration:  fair  Recent and Remote Memory:  fair  Fund of Knowledge:  average         Precautions:     Behavioral Orders   Procedures     Assault precautions     Assault precautions     Code 1 - Restrict to Unit     Elopement precautions     Routine Programming     As clinically indicated     Single Room     Status 15     Every 15 minutes.     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 bipolar disorder w/ psychotic features)  Stimulant Use Disorder, methamphetamine type  Alcohol Use Disorder  Cannabis Use Disorder  Opiate Use Disorder  Tobacco Use Disorder         Assessment & Plan:   Assessment:  31 y/o male with previous psychiatric history significant for multiple diagnoses, including schizoaffective, bipolar type and substance-induced psychosis, along with severe polysubstance abuse (methamphetamines, cannabis, opiates, tobacco) who presented to [facility] ED w/ psychotic symptoms (auditory hallucinations) and agitation in the context of recent methamphetamine usage and multiple altercations with police officers. Patient also described vague thoughts about killing himself. He reported disrupted sleep and medication noncompliance for several months. Following admission, patient has largely been isolative and perseverative on receiving his next dose of methadone; his utox was negative and per staff report has not been experiencing opiate withdrawal symptoms. Patient's methadone dose of 100 mg was confirmed with [facility]; he was last there on Friday, 1/19. Patient reports that psychotic symptoms and SI have largely  resolved since admission to the hospital and scheduled nighttime Zyprexa. Given patient report of IV methamphetamine usage every 1-2 days and improvement since cessation of drug use, patient's psychotic symptoms likely substance-induced. Will plan to provide patient with regular methadone dose today with plan for discharge tomorrow assuming continued improvement. Hospitalization necessary for med management, stabilization, and safety.    Medications:  - Continue Prozac 10 mg daily  - Continue Methadone 100 mg qday  - Increase Neurontin to 300 mg TID  - Continue Zyprexa 5 mg qHS    Medical Problems and Treatments:  #Asthma  - Albuterol PRN  #Opiate Withdrawal  - opiate withdrawal scale D/C'd    Labs:  - CBC/CMP WNL  - Utox positive for amphetamines; negative for opiates    Behavioral/Psychological/Social:  - Status 15  - Assault/elopement/suicide/withdrawal precautions    Legal:  - Voluntary    Disposition:  - Patient endorsing continued depressive symptoms and desire to go to CD treatment. Patient undergoing Rule 25 assessment today; had looked for bed on lower acuity unit but none were available. Will wait for Rule 25 to come through with likely discharge tomorrow.    The patient was discussed with Dr. Paredes, who agrees with this assessment and plan.    Vlad Bolden MD  PGY-2 Psychiatry Resident

## 2018-01-25 PROCEDURE — 25000132 ZZH RX MED GY IP 250 OP 250 PS 637: Performed by: STUDENT IN AN ORGANIZED HEALTH CARE EDUCATION/TRAINING PROGRAM

## 2018-01-25 PROCEDURE — 12400003 ZZH R&B MH CRITICAL UMMC

## 2018-01-25 PROCEDURE — 99231 SBSQ HOSP IP/OBS SF/LOW 25: CPT | Mod: GC | Performed by: PSYCHIATRY & NEUROLOGY

## 2018-01-25 PROCEDURE — 25000132 ZZH RX MED GY IP 250 OP 250 PS 637: Performed by: PSYCHIATRY & NEUROLOGY

## 2018-01-25 PROCEDURE — 25000132 ZZH RX MED GY IP 250 OP 250 PS 637: Performed by: NURSE PRACTITIONER

## 2018-01-25 PROCEDURE — 25000132 ZZH RX MED GY IP 250 OP 250 PS 637: Performed by: INTERNAL MEDICINE

## 2018-01-25 RX ORDER — GABAPENTIN 300 MG/1
300 CAPSULE ORAL 3 TIMES DAILY
Qty: 90 CAPSULE | Refills: 0 | Status: SHIPPED | OUTPATIENT
Start: 2018-01-25 | End: 2018-08-12

## 2018-01-25 RX ORDER — CALCIUM CARBONATE 500 MG/1
500 TABLET, CHEWABLE ORAL 2 TIMES DAILY PRN
Status: DISCONTINUED | OUTPATIENT
Start: 2018-01-25 | End: 2018-01-26 | Stop reason: HOSPADM

## 2018-01-25 RX ORDER — DOCUSATE SODIUM 100 MG/1
100 CAPSULE, LIQUID FILLED ORAL 2 TIMES DAILY
Qty: 60 CAPSULE | Refills: 0 | Status: SHIPPED | OUTPATIENT
Start: 2018-01-25 | End: 2018-02-24

## 2018-01-25 RX ORDER — DOCUSATE SODIUM 100 MG/1
100 CAPSULE, LIQUID FILLED ORAL 2 TIMES DAILY
Status: DISCONTINUED | OUTPATIENT
Start: 2018-01-25 | End: 2018-01-26 | Stop reason: HOSPADM

## 2018-01-25 RX ORDER — FLUOXETINE 10 MG/1
10 CAPSULE ORAL DAILY
Qty: 30 CAPSULE | Refills: 0 | Status: SHIPPED | OUTPATIENT
Start: 2018-01-26 | End: 2018-08-12

## 2018-01-25 RX ADMIN — GABAPENTIN 300 MG: 300 CAPSULE ORAL at 08:23

## 2018-01-25 RX ADMIN — HYDROXYZINE HYDROCHLORIDE 50 MG: 25 TABLET ORAL at 21:30

## 2018-01-25 RX ADMIN — NICOTINE POLACRILEX 4 MG: 2 GUM, CHEWING ORAL at 19:23

## 2018-01-25 RX ADMIN — GABAPENTIN 300 MG: 300 CAPSULE ORAL at 19:55

## 2018-01-25 RX ADMIN — OLANZAPINE 5 MG: 5 TABLET, FILM COATED ORAL at 19:55

## 2018-01-25 RX ADMIN — NICOTINE POLACRILEX 4 MG: 2 GUM, CHEWING ORAL at 18:35

## 2018-01-25 RX ADMIN — FLUOXETINE 10 MG: 10 CAPSULE ORAL at 08:23

## 2018-01-25 RX ADMIN — DOCUSATE SODIUM 100 MG: 100 CAPSULE, LIQUID FILLED ORAL at 10:56

## 2018-01-25 RX ADMIN — NICOTINE POLACRILEX 4 MG: 2 GUM, CHEWING ORAL at 14:32

## 2018-01-25 RX ADMIN — HYDROXYZINE HYDROCHLORIDE 50 MG: 25 TABLET ORAL at 17:29

## 2018-01-25 RX ADMIN — NICOTINE POLACRILEX 4 MG: 2 GUM, CHEWING ORAL at 17:29

## 2018-01-25 RX ADMIN — GABAPENTIN 300 MG: 300 CAPSULE ORAL at 14:32

## 2018-01-25 RX ADMIN — NICOTINE POLACRILEX 4 MG: 2 GUM, CHEWING ORAL at 10:56

## 2018-01-25 RX ADMIN — HYDROXYZINE HYDROCHLORIDE 50 MG: 25 TABLET ORAL at 10:56

## 2018-01-25 RX ADMIN — DOCUSATE SODIUM 100 MG: 100 CAPSULE, LIQUID FILLED ORAL at 19:55

## 2018-01-25 RX ADMIN — NICOTINE POLACRILEX 4 MG: 2 GUM, CHEWING ORAL at 08:25

## 2018-01-25 RX ADMIN — METHADONE HYDROCHLORIDE 100 MG: 5 SOLUTION ORAL at 08:24

## 2018-01-25 ASSESSMENT — ACTIVITIES OF DAILY LIVING (ADL)
DRESS: SCRUBS (BEHAVIORAL HEALTH)
ORAL_HYGIENE: INDEPENDENT
GROOMING: INDEPENDENT
LAUNDRY: UNABLE TO COMPLETE
GROOMING: INDEPENDENT
ORAL_HYGIENE: INDEPENDENT
DRESS: SCRUBS (BEHAVIORAL HEALTH);INDEPENDENT

## 2018-01-25 NOTE — PROGRESS NOTES
[facility], [place]   Psychiatric Progress Note  Hospital Day: 4        Interim History:   The patient's care was discussed with the treatment team during the daily team meeting and/or staff's chart notes were reviewed.  Staff report that patient reported some increased anxiety about remaining in the hospital. He expressed optimism about results of Rule 25 funding.    Upon interview, patient reports that he spoke with Rule 25  about two possible  treatment facilities, [facility] and [facility]. He verbalized understanding of application process required to obtain Rule 25 funding. He denies experiencing ongoing auditory hallucinations. He does feel that Zyprexa continues to be helpful for helping with sleep/anxiety. He did not report noticing much difference after increase in Neurontin. I explained to patient that some increase in anxiety levels can be expected when hospitalized. Patient verbalized understanding. I explained to patient that depending on availability of treatment beds, patient may need to be discharged prior to bed becoming available. He verbalized understanding. Patient did request [facility] for constipation. Patient had no other questions for this writer.         Medications:       docusate sodium  100 mg Oral BID     gabapentin  300 mg Oral TID     FLUoxetine  10 mg Oral Daily     methadone  100 mg Oral Daily     OLANZapine  5 mg Oral At Bedtime          Allergies:     Allergies   Allergen Reactions     Amoxicillin           Labs:     No results found for this or any previous visit (from the past 24 hour(s)).       Psychiatric Examination:     /57  Pulse 58  Temp 97.5  F (36.4  C) (Tympanic)  Resp 16  Wt 76.7 kg (169 lb)  SpO2 97%  BMI 24.96 kg/m2  Weight is 169 lbs 0 oz  Body mass index is 24.96 kg/(m^2).                                             Appearance: awake, alert and dressed in hospital scrubs, sitting up in bed  Attitude:   "cooperative  Eye Contact:  improved  Mood:  \"I'm feeling anxious\"  Affect:  appropriate and in normal range, mood congruent, intensity is blunted and constricted mobility  Speech:  clear, coherent and normal prosody  Language: fluent and intact in English  Psychomotor, Gait, Musculoskeletal:  no evidence of tardive dyskinesia, dystonia, or tics  Throught Process:  logical and goal oriented  Associations:  no loose associations  Thought Content:  no evidence of suicidal ideation or homicidal ideation and no evidence of psychotic thought  Insight:  fair  Judgement:  limited  Oriented to:  time, person, and place  Attention Span and Concentration:  fair  Recent and Remote Memory:  fair  Fund of Knowledge:  average         Precautions:     Behavioral Orders   Procedures     Assault precautions     Code 1 - Restrict to Unit     Elopement precautions     Routine Programming     As clinically indicated     Single Room     Status 15     Every 15 minutes.     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 bipolar disorder w/ psychotic features)  Stimulant Use Disorder, methamphetamine type  Alcohol Use Disorder  Cannabis Use Disorder  Opiate Use Disorder  Tobacco Use Disorder         Assessment & Plan:   Assessment:  33 y/o male with previous psychiatric history significant for multiple diagnoses, including schizoaffective, bipolar type and substance-induced psychosis, along with severe polysubstance abuse (methamphetamines, cannabis, opiates, tobacco) who presented to [facility] ED w/ psychotic symptoms (auditory hallucinations) and agitation in the context of recent methamphetamine usage and multiple altercations with police officers. Patient also described vague thoughts about killing himself. He reported disrupted sleep and medication noncompliance for several months. Following admission, patient has largely been isolative and perseverative on receiving " his next dose of methadone; his utox was negative and per staff report has not been experiencing opiate withdrawal symptoms. Patient's methadone dose of 100 mg was confirmed with [facility]; he was last there on Friday, 1/19. Patient reports that psychotic symptoms and SI have largely resolved since admission to the hospital and scheduled nighttime Zyprexa. Given patient report of IV methamphetamine usage every 1-2 days and improvement since cessation of drug use, patient's psychotic symptoms likely substance-induced. Will plan to provide patient with regular methadone dose today with plan for discharge tomorrow assuming continued improvement. Hospitalization necessary for med management, stabilization, and safety.    Medications:  - Continue Prozac 10 mg daily  - Continue Methadone 100 mg qday  - Continue Neurontin to 300 mg TID  - Continue Zyprexa 5 mg qHS    Medical Problems and Treatments:  #Asthma  - Albuterol PRN  #Opiate Withdrawal  - opiate withdrawal scale D/C'd    Labs:  - CBC/CMP WNL  - Utox positive for amphetamines; negative for opiates    Behavioral/Psychological/Social:  - Status 15  - Assault/elopement/suicide/withdrawal precautions    Legal:  - Voluntary    Disposition:  - Patient endorsing continued depressive symptoms and desire to go to CD treatment. Patient underwent Rule 25 assessment yesterday; waiting today to find out about funding and placement availability. Spoke with patient about likely discharge toward end of the week pending what placement availability will be.    The patient was seen and discussed with Dr. Zhang, who agrees with this assessment and plan.    Vlad Bolden MD  PGY-2 Psychiatry Resident

## 2018-01-25 NOTE — PROGRESS NOTES
Patient was isolative to room majority of the evening but was visible in milieu for food and to use the restroom. He stated he is anxious about being in the hospital and has thoughts about leaving but wants to stay in hopes to get into CD treatment. He stated no current thoughts of SI but did state prior to coming into the hospital had thought and climbed upon onto a bridge thinking about jumping. Patient was relieved to find out he got a rule 25 today and is hopeful to get into treatment soon.      01/24/18 2200   Behavioral Health   Hallucinations denies / not responding to hallucinations   Thinking distractable;confused   Orientation person: oriented;place: oriented   Memory baseline memory   Insight poor   Judgement impaired   Eye Contact at examiner   Affect blunted, flat   Mood mood is calm   Physical Appearance/Attire attire appropriate to age and situation   Hygiene well groomed   Suicidality other (see comments)  (no current thoughts, thoughts in past prior to hospitalizati)   1. Wish to be Dead No   2. Non-Specific Active Suicidal Thoughts  No   Self Injury other (see comment)  (denies, none observed)   Elopement (stated anxiety and thoughts of leaving but wants to stay)   Activity withdrawn;isolative   Speech clear;coherent   Medication Sensitivity no stated side effects;no observed side effects   Psychomotor / Gait balanced;steady   Overt Aggression Scale   Verbal Aggression 0   Aggression against Property 0   Auto-Aggression 0   Physical Aggression 0   Overt Aggression Total Score 0   Sleep/Rest/Relaxation   Day/Evening Time Hours up all shift   Coping/Psychosocial   Verbalized Emotional State anxiety;acceptance;hopefulness   Supportive Measures active listening utilized;care explained to patient/family prior to performing;positive reinforcement provided;relaxation techniques promoted;verbalization of feelings encouraged   Trust Relationship/Rapport emotional support provided;empathic listening  provided;questions answered;questions encouraged;reassurance provided;thoughts/feelings acknowledged   Psycho Education   Type of Intervention 1:1 intervention   Response participates, initiates socially appropriate   Hours 0.5   Treatment Detail Warning signs   Daily Care   Activity up ad hortencia   Activities of Daily Living   Hygiene/Grooming handwashing;shower;independent   Oral Hygiene independent   Dress scrubs (behavioral health)   Laundry unable to complete   Room Organization independent   Activity   Activity Assistance Provided independent   Behavioral Health Interventions   Behavioral Disturbance maintain safety precautions;maintain safe secure environment;simple, clear language;monitor confusion, memory loss, decision making ability and reorient / intervent as needed;provide emotional support   Social and Therapeutic Interventions (Behavioral Disturbance) encourage socialization with peers

## 2018-01-25 NOTE — PLAN OF CARE
Problem: Behavioral Disturbance  Goal: Behavioral Disturbance  Signs and symptoms of listed problems will be absent or manageable by discharge or transition of care.   Outcome: Improving  Pt continues to have symptoms of behavioral disturbance. Pt seems significantly improved at this time. Pt's thinking appears clear and his memory intact. Pt does not have any symptoms of SI/SIB/HI. Pt is generally agreeable to his current plan but reports feeling overall anxious due to uncertainty about where he will be going. Pt's overall affect remains flat. Pt was isolative this shift but was out more as the day progressed. Pt did not attend any groups this shift. Pt did not report any medication side effects this shift. Pt reported hiccups and an upset stomach, paged MD for tums at the pt's request. Pt also had a few blisters on his feet, RN assisted pt in applying bandages to them.

## 2018-01-26 VITALS
SYSTOLIC BLOOD PRESSURE: 111 MMHG | OXYGEN SATURATION: 97 % | HEART RATE: 63 BPM | TEMPERATURE: 96.8 F | DIASTOLIC BLOOD PRESSURE: 69 MMHG | BODY MASS INDEX: 24.96 KG/M2 | RESPIRATION RATE: 16 BRPM | WEIGHT: 169 LBS

## 2018-01-26 PROCEDURE — 99238 HOSP IP/OBS DSCHRG MGMT 30/<: CPT | Mod: GC | Performed by: PSYCHIATRY & NEUROLOGY

## 2018-01-26 PROCEDURE — 25000132 ZZH RX MED GY IP 250 OP 250 PS 637: Performed by: STUDENT IN AN ORGANIZED HEALTH CARE EDUCATION/TRAINING PROGRAM

## 2018-01-26 PROCEDURE — 25000132 ZZH RX MED GY IP 250 OP 250 PS 637: Performed by: PSYCHIATRY & NEUROLOGY

## 2018-01-26 RX ADMIN — NICOTINE POLACRILEX 4 MG: 2 GUM, CHEWING ORAL at 08:55

## 2018-01-26 RX ADMIN — NICOTINE POLACRILEX 4 MG: 2 GUM, CHEWING ORAL at 10:18

## 2018-01-26 RX ADMIN — GABAPENTIN 300 MG: 300 CAPSULE ORAL at 07:52

## 2018-01-26 RX ADMIN — DOCUSATE SODIUM 100 MG: 100 CAPSULE, LIQUID FILLED ORAL at 07:52

## 2018-01-26 RX ADMIN — FLUOXETINE 10 MG: 10 CAPSULE ORAL at 07:52

## 2018-01-26 RX ADMIN — METHADONE HYDROCHLORIDE 100 MG: 5 SOLUTION ORAL at 07:53

## 2018-01-26 RX ADMIN — NICOTINE POLACRILEX 4 MG: 2 GUM, CHEWING ORAL at 07:52

## 2018-01-26 ASSESSMENT — ACTIVITIES OF DAILY LIVING (ADL)
GROOMING: INDEPENDENT
DRESS: SCRUBS (BEHAVIORAL HEALTH)
ORAL_HYGIENE: INDEPENDENT

## 2018-01-26 NOTE — PROGRESS NOTES
Pt discharged today with shelter options and all of the information needed to follow-up regarding the chemical dependency assessment that was completed. He has methadone maintanence, follow-up with Dr. Alfaro or mental health walk-in through [facility].  He plans to follow-up with [facility] as this was where he was referred to; to keep up with their wait list.

## 2018-01-26 NOTE — PROGRESS NOTES
"Pt was in the milieu, selectively social with peers.  Pt plans on being d/lidia tomorrow.  \"I really don't have any plan.  Pt [facility],  \"I've been to tx a ton of times.\"  Pt admitted he does not know how this tx will be any different than his last attempt.  \"To be honest, I have no friends + no where to go.  I've got to go somewhere + going to tx. Will buy me a little more time to figure out what to do + where to go next.\"  Pt did not appear to have any plans beyond going to tx again.  "

## 2018-01-26 NOTE — DISCHARGE INSTRUCTIONS
Behavioral Discharge Planning and Instructions      Summary:  You were admitted on 1/20/2018  due to Chemical Use Issues.  You were treated by Dr. 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  Dr. French  [address] [place] [address]  Phone: [phone]   -While you were hospitalized you had a Rule 25 Chemical Dependency Assessment completed.  This was sent to [facility] as well as [facility] for treatment options.  You will need to follow-up with them upon discharge.   Phone:   [address] [place] [address] [phone]    - Psychiatric Follow-Up  [facility]  Open for Psychiatry Walk-in from 9am-12pm  [facility] [address] [place], [place]   Phone: [phone]  Fax:  [phone]    Monday, Tuesday, Thursday, Friday: 8 a.m. to 5 p.m.  Wednesday: 8 a.m. to 6 p.m.    Appointments: [phone]  Fax: [phone]    -Shelter's   You were provided with a [employer]. You are planning to go to a shelter. Here are a few additional options in [facility]!  [facility]  [address] [place] [address]  Phone: [phone]    [facility]  [address] [place] [address]  Phone: [phone]    [facility]  [address], [place] [address]  Phone: [phone]    [facility]  [address] [address]  Phone: [phone]  Attend all scheduled appointments with your outpatient providers. Call at least 24 hours in advance if you need to reschedule an appointment to ensure continued access to your outpatient providers.   Major Treatments, Procedures and Findings:  You were provided with: a psychiatric assessment, assessed for medical stability, medication evaluation and/or management and individual therapy    Symptoms to Report: feeling more aggressive, increased confusion, losing more sleep, mood getting worse or thoughts of suicide    Early warning signs can include: increased depression or anxiety sleep disturbances increased thoughts or behaviors of suicide or self-harm  increased unusual thinking, such as paranoia or hearing voices    Safety and Wellness:  Take all medicines as directed.  Make no changes unless your doctor suggests them.      Follow treatment recommendations.  Refrain from alcohol and non-prescribed drugs.  If there is a concern for safety, call 641.    Resources:   Crisis Intervention: 399.480.8984 or 993-718-2456 (TTY: 366.760.9493).  Call anytime for help.  National [place] on Mental Illness (www.[place].kevin.org): 123.268.2755 or 151-525-3726.  [place] Association for Children's Mental Health (www.macmh.org): 277.918.6628.  Alcoholics Anonymous (www.alcoholics-anonymous.org): Check your phone book for your local chapter.  Suicide Awareness Voices of Education (SAVE) (www.save.org): 456-028-GPHB (0483)  National Suicide Prevention Line (www.mentalhealthmn.org): 880-399-VOOF (8257)  Mental Health Consumer/Survivor Network of [place] (www.mhcsn.net): 265.798.2566 or 539-074-5341  Mental Health Association of [place] (www.mentalhealth.org): 560.826.4772 or 968-878-0841  Self- Management and Recovery Training., SMART-- Toll free: 585.464.5758  www.[employer].org  [facility] Crisis (COPE) Response - Adult 789 897-6284  [facility] Crisis Response - Adult  "135.667.8248  Text 4 Life: txt \"LIFE\" to [address]30 for immediate support and crisis intervention  Crisis text line: Text \"START\" to 042-137. Free, confidential, 24/7.  Crisis Intervention: 670.348.6123 or 978-894-9958. Call anytime for help.   [facility] Mental Health Crisis Team - Child: [phone]  [facility] Mental Health Crisis Response Team - Child: [phone]    The treatment team has appreciated the opportunity to work with you and thank you for choosing the [facility].   If you have any questions or concerns our unit number is [phone].      "

## 2018-01-26 NOTE — DISCHARGE SUMMARY
----------------------------------------------------------------------------------------------------------  [facility], [place]   Discharge Summary      Moisés Cohn MRN# [id number]   Age: 32 year old YOB: 1985     Date of Admission:  1/20/2018  Date of Discharge:  1/26/2018  Admitting Physician:  Fern Zhang MD  Discharge Physician:  Fern Zhang MD         Event Leading to Hospitalization:     The patient is a 31 y/o male with previous hx of polysubstance abuse who presented to [facility] ED describing recent increase in agitation, auditory hallucinations, and potential SI. Patient reported that he had been in multiple altercations with police and was using IV methamphetamines. He endorsed auditory hallucinations that were causing increased levels of anxiety and thoughts of harming others (non-specific). He was admitted to inpatient unit for safety and stabilization.       See admission note by EMELIA Salomon CNP on 1/21/18 for additional details.          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 CAITLIN Paredes and Dr. Fern Zhang as a voluntary patient. The patient was placed under status 15 (15 minute checks) to ensure patient safety. CBC, BMP and utox obtained.    On admission, outpatient medications were continued.    Presentation of current episode: On admission, Zyprexa was started to target patient's auditory hallucinations. Methadone was held until dosing  and compliance could be confirmed at methadone clinic. Patient initially endorsed auditory hallucinations and vague thoughts of hurting/killing himself. He expressed feeling down about his current life situation (homeless, lack of close family members/friends) and requested to go to CD treatment. A Rule 25 evaluation was requested and performed. Inquiries were made at [facility]. Patient continued to describe significant anxiety, for which Neurontin was increased. Toward end of hospitalization, patient endorsed resolution of auditory hallucinations and suicidal thoughts. Patient expressed anxiety living situation; patient was offered resources for locating homeless shelters, which patient declined. Patient was discharged with plans for him to follow-up with Rule 25 funding and treatment facility options.    The patient's symptoms of psychosis and suicidal ideation improved.     Moisés Cohn was discharged with plan to pursue CD treatment. At the time of discharge Moisés Cohn was determined to not be a danger to himself or others.    Today Moisés Cohn denies SI/SIB/HI. He denies experiencing A/V hallucinations. In addition, Moisés Cohn has notable risk factors for self-harm, including single status and substance abuse. However, risk is mitigated by history of seeking help when needed.Additional steps taken to minimize risk include: offering patient opportunity for psychiatric follow-up. Therefore, based on all available evidence including the factors cited above, Moisés Cohn does not appear to be at imminent risk for self-harm, and is appropriate for outpatient level of care.     This document serves as a transfer of care to Moisés Cohn's outpatient providers.         Discharge Medications:     Prozac 10 mg daily  Neurontin 300 mg TID  Methadone 100 mg daily (to be obtained at methadone clinic)  Zyprexa 5 mg qHS    Colace 100 mg BID         Psychiatric Examination:      Appearance: awake, alert, dressed in hospital scrubs, beard, appears slightly disheveled  Attitude:  cooperative  Eye Contact:  fair  Mood:  Improved, slightly anxious  Affect:  mood congruent and intensity is normal, restricted  Speech:  clear, coherent and normal prosody  Psychomotor Behavior:  no evidence of tardive dyskinesia, dystonia, or tics  Thought Process:  logical and goal oriented  Associations:  no loose associations  Thought Content:  no evidence of suicidal ideation or homicidal ideation and no evidence of psychotic thought  Insight:  fair  Judgment:  fair  Oriented to:  time, person, and place  Attention Span and Concentration:  fair  Recent and Remote Memory:  fair  Language: English with appropriate syntax and vocabulary  Fund of Knowledge: appropriate  Muscle Strength and Tone: normal  Gait and Station: Normal         Discharge Plan:     Health Care Follow-up Appointments:   - [facility] - [address] [address]  Phone: [phone]  Hours:   Mon- Friday 5:30am -2:30pm  Sat and Sunday 6:30am-10:30am  - [facility] [employer] [facility]. / [facility]  Dr. French  Appointment:  [address] [place] [address]  Phone: [phone]   -While you were hospitalized you had a Rule 25 Chemical Dependency Assessment completed.  This was sent to [facility] as well as [facility] for treatment options.  You will need to follow-up with them upon discharge.   Phone:   [address] [place] [address] [phone]     - Psychiatric Follow-Up  [facility]  Open for Psychiatry Walk-in from 9am-12pm  [facility] [address] [facility] [facility] [place]   Phone: [phone]  Fax:  [phone]     Monday, Tuesday, Thursday, Friday: 8 a.m. to 5 p.m.  Wednesday: 8 a.m. to 6 p.m.     Appointments: [phone]  Fax: [phone]    The patient was seen and discussed with Dr. Zhang, who agrees with  this assessment and plan.    Vlad Bolden MD  PGY-2 Psychiatry Resident    Attestation:    This patient has been seen and evaluated by me, Fern Zhang MD on the date of this note. I have discussed this patient with the the resident and I agree with the findings and plan in this note. I have reviewed today's vital signs, medications, labs and imaging. Patient has been at his baseline for past several days. He has not exhibited any agitation or aggressive behaviors. There is no indication for ongoing hospitalization as there are no imminent safety concerns. Patient denies SI, SIB, and HI. He agrees to abstain from illicit drugs and alcohol. He is future oriented and expressed strong desire to engage in an inpatient CD program once bed is available.

## 2018-01-26 NOTE — PLAN OF CARE
Problem: Behavioral Disturbance  Goal: Behavioral Disturbance  Signs and symptoms of listed problems will be absent or manageable by discharge or transition of care.   Outcome: Adequate for Discharge Date Met: 01/26/18  Pt has shown significant improvement improvement in behavioral disturbance symptoms. Pt is adequate for discharge. Pt denies SI/SIB/HI. Pt does not have any symptoms of psychosis or eric at this time. Pt's affect is flat and he reports feeling somewhat depressed. Pt is hopeful that he will be able to get into treatment. Pt reports feeling anxious about his living situation outside the hospital. Pt is not reporting any new physical health complaints or side effects from medications. Pt's VS were WDL this shift.

## 2018-03-29 ENCOUNTER — HOSPITAL ENCOUNTER (EMERGENCY)
Facility: CLINIC | Age: 33
Discharge: HOME | End: 2018-03-29
Attending: FAMILY MEDICINE | Admitting: FAMILY MEDICINE
Payer: COMMERCIAL

## 2018-03-29 VITALS
TEMPERATURE: 95.6 F | OXYGEN SATURATION: 98 % | HEART RATE: 66 BPM | DIASTOLIC BLOOD PRESSURE: 64 MMHG | RESPIRATION RATE: 16 BRPM | SYSTOLIC BLOOD PRESSURE: 102 MMHG

## 2018-03-29 DIAGNOSIS — Z59.00 HOMELESSNESS: ICD-10-CM

## 2018-03-29 DIAGNOSIS — F19.10 POLYSUBSTANCE ABUSE (H): ICD-10-CM

## 2018-03-29 DIAGNOSIS — F60.2 ANTISOCIAL PERSONALITY DISORDER (H): ICD-10-CM

## 2018-03-29 PROCEDURE — 99285 EMERGENCY DEPT VISIT HI MDM: CPT | Mod: 25 | Performed by: FAMILY MEDICINE

## 2018-03-29 PROCEDURE — 80307 DRUG TEST PRSMV CHEM ANLYZR: CPT | Performed by: FAMILY MEDICINE

## 2018-03-29 PROCEDURE — 90791 PSYCH DIAGNOSTIC EVALUATION: CPT

## 2018-03-29 PROCEDURE — 80320 DRUG SCREEN QUANTALCOHOLS: CPT | Performed by: FAMILY MEDICINE

## 2018-03-29 PROCEDURE — 99284 EMERGENCY DEPT VISIT MOD MDM: CPT | Mod: Z6 | Performed by: FAMILY MEDICINE

## 2018-03-29 RX ORDER — OLANZAPINE 5 MG/1
5 TABLET ORAL AT BEDTIME
Qty: 10 TABLET | Refills: 0 | Status: SHIPPED | OUTPATIENT
Start: 2018-03-29 | End: 2018-08-12

## 2018-03-29 SDOH — ECONOMIC STABILITY - HOUSING INSECURITY: HOMELESSNESS UNSPECIFIED: Z59.00

## 2018-03-29 NOTE — DISCHARGE INSTRUCTIONS
Discharge to shelter placement at [address] with plans to follow-up with outpatient providers for further chemical dependency treatment.

## 2018-03-29 NOTE — ED NOTES
Bed: HW02  Expected date: 3/29/18  Expected time: 2:30 PM  Means of arrival: Ambulance  Comments:  [facility]  32y M  Mental Health Eval

## 2018-03-29 NOTE — ED AVS SNAPSHOT
[facility], Emergency Department    [address]    [facility] [address]    Phone:  [phone]    Fax:  [phone]                                       Moisés Cohn   MRN: [id number]    Department:  [facility], Emergency Department   Date of Visit:  3/29/2018           After Visit Summary Signature Page     I have received my discharge instructions, and my questions have been answered. I have discussed any challenges I see with this plan with the nurse or doctor.    ..........................................................................................................................................  Patient/Patient Representative Signature      ..........................................................................................................................................  Patient Representative Print Name and Relationship to Patient    ..................................................               ................................................  Date                                            Time    ..........................................................................................................................................  Reviewed by Signature/Title    ...................................................              ..............................................  Date                                                            Time

## 2018-03-29 NOTE — ED AVS SNAPSHOT
[facility], Emergency Department    [address]    [place] [address]    Phone:  [phone]    Fax:  [phone]                                       Moisés Cohn   MRN: [id number]    Department:  [facility], Emergency Department   Date of Visit:  3/29/2018           Patient Information     Date Of Birth          1985        Your diagnoses for this visit were:     Polysubstance abuse     Homelessness     Antisocial personality disorder        You were seen by Henry Khanna MD.      Follow-up Information     Follow up with Follow-up with your outpatient providers for referral to chemical dependency treatment..        Discharge Instructions       Discharge to shelter placement at [address] [place] [address] with plans to follow-up with outpatient providers for further chemical dependency treatment.    24 Hour Appointment Hotline       To make an appointment at any [facility] clinic, call [phone] ([phone]). If you don't have a family doctor or clinic, we will help you find one. [facility] clinics are conveniently located to serve the needs of you and your family.             Review of your medicines      Our records show that you are taking the medicines listed below. If these are incorrect, please call your family doctor or clinic.        Dose / Directions Last dose taken    albuterol 90 MCG/ACT inhaler   Quantity:  1        1-2 puffs Q 4-6 hrs prn   Refills:  11        FLUoxetine 10 MG capsule   Commonly known as:  PROzac   Dose:  10 mg   Quantity:  30 capsule        Take 1 capsule (10 mg) by mouth daily   Refills:  0        gabapentin 300 MG capsule   Commonly known as:  NEURONTIN   Dose:  300 mg   Quantity:  90 capsule        Take 1 capsule (300 mg) by mouth 3 times daily   Refills:  0        methadone 10 MG/ML (HIGH CONC) solution   Commonly known as:  DOLOPHINE-INTENSOL   Dose:  100 mg        Take 100 mg by mouth daily   Refills:  0        OLANZapine 5 MG tablet   Commonly known  "as:  zyPREXA   Dose:  5 mg   Quantity:  30 tablet        Take 1 tablet (5 mg) by mouth At Bedtime   Refills:  0                Procedures and tests performed during your visit     Drug abuse screen 6 urine (tox)      Orders Needing Specimen Collection     None      Pending Results     No orders found from 3/27/2018 to 3/30/2018.            Pending Culture Results     No orders found from 3/27/2018 to 3/30/2018.            Pending Results Instructions     If you had any lab results that were not finalized at the time of your Discharge, you can call the ED Lab Result RN at [phone]. You will be contacted by this team for any positive Lab results or changes in treatment. The nurses are available 7 days a week from 10A to 6:30P.  You can leave a message 24 hours per day and they will return your call.        Thank you for choosing [facility]       Thank you for choosing [facility] for your care. Our goal is always to provide you with excellent care. Hearing back from our patients is one way we can continue to improve our services. Please take a few minutes to complete the written survey that you may receive in the mail after you visit with us. Thank you!        [employer] Information     [employer] lets you send messages to your doctor, view your test results, renew your prescriptions, schedule appointments and more. To sign up, go to [email or web address] . Click on \"Log in\" on the left side of the screen, which will take you to the Welcome page. Then click on \"Sign up Now\" on the right side of the page.     You will be asked to enter the access code listed below, as well as some personal information. Please follow the directions to create your username and password.     Your access code is: [email or web address]  Expires: 2018 11:56 PM     Your access code will  in 90 days. If you need help or a new code, please call your [facility] clinic or [phone].        Care EveryWhere ID     This is your Care EveryWhere ID. " This could be used by other organizations to access your [place] medical records  [id number]        Equal Access to Services     DALTON GRIMES : Hadii bere Rice, loi daley, pop miramontes. So [facility] [phone].    ATENCIÓN: Si habla español, tiene a nelson disposición servicios gratuitos de asistencia lingüística. Llame al [phone].    We comply with applicable federal civil rights laws and [place] laws. We do not discriminate on the basis of race, color, national origin, age, disability, sex, sexual orientation, or gender identity.            After Visit Summary       This is your record. Keep this with you and show to your community pharmacist(s) and doctor(s) at your next visit.

## 2018-03-29 NOTE — ED NOTES
Patient suicidal with plan to inject antifreeze in his veins.    Patient was found living in a stairway per police, patient is homeless.  Patient reports he has not had anything to eat or drink for multiple days.

## 2018-04-01 ASSESSMENT — ENCOUNTER SYMPTOMS
DYSPHORIC MOOD: 1
FEVER: 0
SHORTNESS OF BREATH: 0
ABDOMINAL PAIN: 0
AGITATION: 1
NERVOUS/ANXIOUS: 1

## 2018-04-01 NOTE — ED PROVIDER NOTES
"  History     Chief Complaint   Patient presents with     Suicidal     SI with plan. Brought in with EMS.      Paranoid     History of Meth IV use.      Homicidal     Patient reports HI without plan towards \"some people I know\"     HPI  Moisés Cohn is a 32 year old male who presents to the emergency room after being discovered in a stairwell on the [facility] patient initially admitted statement about wanting to inject himself with radiator fluid however patient states that he wanted to be brought here is homeless and was feeling vulnerable.  Patient denies suicidal ideation at this time.  Patient was seen and evaluated by the  please refer to her documentation as well.  Patient has a long history of chemical dependency as well as mood disorder and question of possible antisocial personality disorder.    I have reviewed the Medications, Allergies, Past Medical and Surgical History, and Social History in the Epic system.    PERSONAL MEDICAL HISTORY  Past Medical History:   Diagnosis Date     Allergy      Anxiety      Asthma      Depressive disorder      Substance abuse      PAST SURGICAL HISTORY  Past Surgical History:   Procedure Laterality Date     ENT SURGERY      rhinoplasty     FAMILY HISTORY  Family History   Problem Relation Age of Onset     C.A.D. Maternal Grandmother      Neurologic Disorder Father      die from cyst in brain     CEREBROVASCULAR DISEASE Mother      SOCIAL HISTORY  Social History   Substance Use Topics     Smoking status: Current Every Day Smoker     Packs/day: 0.50     Years: 6.00     Types: Cigarettes     Smokeless tobacco: Never Used      Comment: Patient declined smoking cessation information     Alcohol use Yes      Comment: occasional     MEDICATIONS  No current facility-administered medications for this encounter.      Current Outpatient Prescriptions   Medication     OLANZapine (ZYPREXA) 5 MG tablet     methadone (DOLOPHINE-INTENSOL) 10 MG/ML (HIGH CONC) solution     " gabapentin (NEURONTIN) 300 MG capsule     FLUoxetine (PROZAC) 10 MG capsule     ALBUTEROL 90 MCG/ACT IN AERS     ALLERGIES  Allergies   Allergen Reactions     Amoxicillin          Review of Systems   Constitutional: Negative for fever.   Respiratory: Negative for shortness of breath.    Cardiovascular: Negative for chest pain.   Gastrointestinal: Negative for abdominal pain.   Psychiatric/Behavioral: Positive for agitation, behavioral problems and dysphoric mood. The patient is nervous/anxious.    All other systems reviewed and are negative.      Physical Exam   BP: 97/67  Pulse: 63  Temp: 95.6  F (35.3  C)  Resp: 16  SpO2: 99 %      Physical Exam   Constitutional: He is oriented to person, place, and time. No distress.   HENT:   Head: Atraumatic.   Mouth/Throat: Oropharynx is clear and moist. No oropharyngeal exudate.   Eyes: Pupils are equal, round, and reactive to light. No scleral icterus.   Cardiovascular: Normal heart sounds and intact distal pulses.    Pulmonary/Chest: Breath sounds normal. No respiratory distress.   Abdominal: Soft. Bowel sounds are normal. There is no tenderness.   Musculoskeletal: He exhibits no edema or tenderness.   Neurological: He is alert and oriented to person, place, and time. No cranial nerve deficit. He exhibits normal muscle tone. Coordination normal.   Skin: Skin is warm. No rash noted. He is not diaphoretic.   Psychiatric: His mood appears anxious. He is agitated. He exhibits a depressed mood.       ED Course     ED Course     Procedures         Critical Care time:  none             Labs Ordered and Resulted from Time of ED Arrival Up to the Time of Departure from the ED   DRUG ABUSE SCREEN 6 CHEM DEP URINE ([facility]) - Abnormal; Notable for the following:        Result Value    Amphetamine Qual Urine Positive (*)     All other components within normal limits            Assessments & Plan (with Medical Decision Making)     I have reviewed the nursing notes.    I have reviewed the  findings, diagnosis, plan and need for follow up with the patient.    Patient evaluated after initially being discovered on the [employer] and initially had made suicidal statements however after further evaluation states that his homelessness his primary issue and that he would be feeling safe if he was brought to shelter placement.  Patient will be discharged we are contacting [address] [place] [address] that has a bed available for him he will be transported by bus to [address] [place] [address].    Final diagnoses:   Polysubstance abuse   Homelessness   Antisocial personality disorder       3/29/2018   [facility], [place], EMERGENCY DEPARTMENT     Henry Khanna MD  04/01/18 0712

## 2018-06-11 ENCOUNTER — HOSPITAL ENCOUNTER (EMERGENCY)
Facility: CLINIC | Age: 33
Discharge: HOME | End: 2018-06-11
Attending: EMERGENCY MEDICINE | Admitting: EMERGENCY MEDICINE
Payer: COMMERCIAL

## 2018-06-11 VITALS
DIASTOLIC BLOOD PRESSURE: 62 MMHG | TEMPERATURE: 98.9 F | RESPIRATION RATE: 17 BRPM | OXYGEN SATURATION: 98 % | SYSTOLIC BLOOD PRESSURE: 107 MMHG | HEART RATE: 105 BPM

## 2018-06-11 DIAGNOSIS — F15.10 METHAMPHETAMINE ABUSE (H): ICD-10-CM

## 2018-06-11 PROCEDURE — 90791 PSYCH DIAGNOSTIC EVALUATION: CPT

## 2018-06-11 PROCEDURE — 99283 EMERGENCY DEPT VISIT LOW MDM: CPT | Mod: Z6 | Performed by: EMERGENCY MEDICINE

## 2018-06-11 PROCEDURE — 99285 EMERGENCY DEPT VISIT HI MDM: CPT | Mod: 25

## 2018-06-11 ASSESSMENT — ENCOUNTER SYMPTOMS
HEADACHES: 1
HALLUCINATIONS: 0

## 2018-06-11 NOTE — ED AVS SNAPSHOT
[facility], Emergency Department    [address]    [facility] [address]    Phone:  [phone]    Fax:  [phone]                                       Moisés Cohn   MRN: [id number]    Department:  [facility], Emergency Department   Date of Visit:  6/11/2018           After Visit Summary Signature Page     I have received my discharge instructions, and my questions have been answered. I have discussed any challenges I see with this plan with the nurse or doctor.    ..........................................................................................................................................  Patient/Patient Representative Signature      ..........................................................................................................................................  Patient Representative Print Name and Relationship to Patient    ..................................................               ................................................  Date                                            Time    ..........................................................................................................................................  Reviewed by Signature/Title    ...................................................              ..............................................  Date                                                            Time

## 2018-06-11 NOTE — DISCHARGE INSTRUCTIONS
"Information given for CD treatment options.     Pursue CD treatment.         Understanding Methamphetamine Abuse and Addiction  Methamphetamine (also known as meth or crystal meth) is a manmade drug that affects brain function. Over time, it can change the way you think and act. Some of these changes can cause you great distress. And they can disrupt your life. But methamphetamine addiction can be treated. If you or a loved one has a drug problem, tell someone you trust. That is the first step in getting help.  What does methamphetamine do?  Some drugs slow down your system. But methamphetamine speeds it up. In fact, methamphetamine is often known as  speed,  or \"crank,\" Users have increased energy. Some may go days without food or sleep. The drug comes in many forms that users inject, smoke, inhale, or eat. Methamphetamine causes an intense rush that may last from minutes to hours.  What are the risks?  Methamphetamine triggers your brain to release large amounts of the chemical dopamine. This causes feelings of extreme well-being. It may also damage the cells that produce dopamine. This can make it harder to feel pleasure over time. Using methamphetamine may also lead to these problems:    Addiction. This means you develop a strong physical and psychological dependence on the drug. And you may not be able to stop taking it on your own. A potent form of methamphetamine known as  ice  or \"crystal meth\" is even more addictive.    Overdose. You may need more and more methamphetamine to feel good. But taking too much can lead to seizures or death.    Exposure to HIV. Using shared needles to inject methamphetamine can spread the virus that cause AIDS and hepatitis.    Hallucinations (hearing and seeing things that aren t there).    Paranoia (intense feelings of fear of other people).    Violent action    Bleeding in the brain    Severe dental problems  How can you get help?  In many cases, your healthcare provider can " help. Or, check your phone book or the Internet for mental health centers and drug treatment programs. You can also try the resources below.  Resources  Substance Abuse and Mental Health Services Helpline  808.126.4366 (HELP) http://www.samhsa.gov/treatment/   The National [place] on Drug Abuse  517.779.3659  www.drugabuse.gov/drugs-abuse   Crystal Meth Anonymous 238-196-9014 www.crystalmeth.org    Date Last Reviewed: 2/1/2017 2000-2017 [employer]. [address] [place] [address]. All rights reserved. This information is not intended as a substitute for professional medical care. Always follow your healthcare professional's instructions.

## 2018-06-11 NOTE — ED PROVIDER NOTES
"    History     Chief Complaint   Patient presents with     Hallucinations     HPI  Moisés Cohn is a 33 year old male with a history of psychoactive substance abuse with psychotic disorder, substance abuse (meth), anxiety, and depressive disorder who presents to the ED because he is \"stressed out\".  He states walked into the police station today because he was stressed and his feel hurt from walking around a lot.  He denies hallucinations, si/hi.    He reports he has not slept in a while, and went to the police station today because he wanted to find a place to rest.  He called EMS.  When asked about what he is stressed out about, he replies \"I am not able to describe it in words to you.\"  He states he uses IV methamphetamine sometimes and last used couple days ago.  He complains about headache.  He was currently denies any hallucinations or taking medications for anything. ED staff said he was acting oddly earlier today while awaiting to be seen but is more appropriate now.     PAST MEDICAL HISTORY  Past Medical History:   Diagnosis Date     Allergy      Anxiety      Asthma      Depressive disorder      Substance abuse      PAST SURGICAL HISTORY  Past Surgical History:   Procedure Laterality Date     ENT SURGERY      rhinoplasty     FAMILY HISTORY  Family History   Problem Relation Age of Onset     C.A.D. Maternal Grandmother      Neurologic Disorder Father      die from cyst in brain     CEREBROVASCULAR DISEASE Mother      SOCIAL HISTORY  Social History   Substance Use Topics     Smoking status: Current Every Day Smoker     Packs/day: 0.50     Years: 6.00     Types: Cigarettes     Smokeless tobacco: Never Used      Comment: Patient declined smoking cessation information     Alcohol use Yes      Comment: occasional     MEDICATIONS  No current facility-administered medications for this encounter.      Current Outpatient Prescriptions   Medication     ALBUTEROL 90 MCG/ACT IN AERS     FLUoxetine (PROZAC) 10 MG " capsule     gabapentin (NEURONTIN) 300 MG capsule     methadone (DOLOPHINE-INTENSOL) 10 MG/ML (HIGH CONC) solution     OLANZapine (ZYPREXA) 5 MG tablet     ALLERGIES  Allergies   Allergen Reactions     Amoxicillin          I have reviewed the Medications, Allergies, Past Medical and Surgical History, and Social History in the Epic system.    Review of Systems   Neurological: Positive for headaches.   Psychiatric/Behavioral: Negative for hallucinations.   All other systems reviewed and are negative.      Physical Exam   BP: 128/78  Pulse: 122  Temp: 99.6  F (37.6  C)  SpO2: 98 %      Physical Exam   Constitutional: He is oriented to person, place, and time. He appears well-developed and well-nourished. No distress.   HENT:   Head: Normocephalic and atraumatic.   Right Ear: External ear normal.   Left Ear: External ear normal.   Nose: Nose normal.   Eyes: EOM are normal.   Neck: Normal range of motion.   Cardiovascular: Normal rate, regular rhythm and normal heart sounds.    Pulmonary/Chest: Effort normal and breath sounds normal.   Musculoskeletal: Normal range of motion.   Neurological: He is alert and oriented to person, place, and time.   Skin: He is not diaphoretic.   Mild sunburn to arms and face   Psychiatric: He has a normal mood and affect. His speech is normal and behavior is normal. Judgment and thought content normal. Cognition and memory are normal.   Nursing note and vitals reviewed.      ED Course     ED Course     Procedures   4:21 PM  The patient was seen and examined by Dr. Roper in Room 11.              Labs Ordered and Resulted from Time of ED Arrival Up to the Time of Departure from the ED - No data to display         Assessments & Plan (with Medical Decision Making)   The patient was seen by myself and the DEC .  He uses meth and apparently was acting oddly earlier today. Now, he is calm and cooperative.  No delusions or psychosis noted.  No si/hi.  He is appropriate during the  conversation.  He was given information on where to f/u if he wants to pursue cd treatment.  He was d/c.      I have reviewed the nursing notes.    I have reviewed the findings, diagnosis, plan and need for follow up with the patient.    New Prescriptions    No medications on file       Final diagnoses:   Methamphetamine abuse     I, Salo Mcmahon, am serving as a trained medical scribe to document services personally performed by Itzel Roper MD, based on the provider's statements to me.      I, Itzel Roper MD, was physically present and have reviewed and verified the accuracy of this note documented by Salo Mcmahon.       6/11/2018   [facility], [place], EMERGENCY DEPARTMENT     Itzel Roper MD  06/11/18 1751       Itzel Roper MD  06/11/18 1759

## 2018-06-11 NOTE — ED NOTES
"Patient appears to be responding to voices, calling his penis at \"demon\" and \" that you should grab it and suck it\"  Patient is able to be redirected but reverts back to similar conversation.  Patient is pacing in room, staring out window. Patient has removed his clothes once and has been told to keep his clothes on.  "

## 2018-06-11 NOTE — ED AVS SNAPSHOT
" [facility], Emergency Department    [address]    [place] [address]    Phone:  [phone]    Fax:  [phone]                                       Moisés Cohn   MRN: [id number]    Department:  [facility], Emergency Department   Date of Visit:  6/11/2018           Patient Information     Date Of Birth          1985        Your diagnoses for this visit were:     Methamphetamine abuse        You were seen by Itzel Roper MD.        Discharge Instructions       Information given for CD treatment options.     Pursue CD treatment.         Understanding Methamphetamine Abuse and Addiction  Methamphetamine (also known as meth or crystal meth) is a manmade drug that affects brain function. Over time, it can change the way you think and act. Some of these changes can cause you great distress. And they can disrupt your life. But methamphetamine addiction can be treated. If you or a loved one has a drug problem, tell someone you trust. That is the first step in getting help.  What does methamphetamine do?  Some drugs slow down your system. But methamphetamine speeds it up. In fact, methamphetamine is often known as  speed,  or \"crank,\" Users have increased energy. Some may go days without food or sleep. The drug comes in many forms that users inject, smoke, inhale, or eat. Methamphetamine causes an intense rush that may last from minutes to hours.  What are the risks?  Methamphetamine triggers your brain to release large amounts of the chemical dopamine. This causes feelings of extreme well-being. It may also damage the cells that produce dopamine. This can make it harder to feel pleasure over time. Using methamphetamine may also lead to these problems:    Addiction. This means you develop a strong physical and psychological dependence on the drug. And you may not be able to stop taking it on your own. A potent form of methamphetamine known as  ice  or \"crystal meth\" is even more " addictive.    Overdose. You may need more and more methamphetamine to feel good. But taking too much can lead to seizures or death.    Exposure to HIV. Using shared needles to inject methamphetamine can spread the virus that cause AIDS and hepatitis.    Hallucinations (hearing and seeing things that aren t there).    Paranoia (intense feelings of fear of other people).    Violent action    Bleeding in the brain    Severe dental problems  How can you get help?  In many cases, your healthcare provider can help. Or, check your phone book or the Internet for mental health centers and drug treatment programs. You can also try the resources below.  Resources  Substance Abuse and Mental Health Services Helpline  364.406.7240 (HELP) http://www.sama.gov/treatment/   The National [place] on Drug Abuse  309.207.8634  www.drugabuse.gov/drugs-abuse   Crystal Meth Anonymous 698-559-3108 www.crystalmeth.org    Date Last Reviewed: 2/1/2017 2000-2017 The [employer]. [address] [place] [address]. All rights reserved. This information is not intended as a substitute for professional medical care. Always follow your healthcare professional's instructions.          24 Hour Appointment Hotline       To make an appointment at any [facility], call [phone] ([phone]). If you don't have a family doctor or clinic, we will help you find one. [facility] clinics are conveniently located to serve the needs of you and your family.             Review of your medicines      Our records show that you are taking the medicines listed below. If these are incorrect, please call your family doctor or clinic.        Dose / Directions Last dose taken    albuterol 90 MCG/ACT inhaler   Quantity:  1        1-2 puffs Q 4-6 hrs prn   Refills:  11        FLUoxetine 10 MG capsule   Commonly known as:  PROzac   Dose:  10 mg   Quantity:  30 capsule        Take 1 capsule (10 mg) by mouth daily   Refills:  0         "gabapentin 300 MG capsule   Commonly known as:  NEURONTIN   Dose:  300 mg   Quantity:  90 capsule        Take 1 capsule (300 mg) by mouth 3 times daily   Refills:  0        methadone 10 MG/ML (HIGH CONC) solution   Commonly known as:  DOLOPHINE-INTENSOL   Dose:  100 mg        Take 100 mg by mouth daily   Refills:  0        OLANZapine 5 MG tablet   Commonly known as:  zyPREXA   Dose:  5 mg   Quantity:  10 tablet        Take 1 tablet (5 mg) by mouth At Bedtime   Refills:  0                Orders Needing Specimen Collection     None      Pending Results     No orders found from 6/9/2018 to 6/12/2018.            Pending Culture Results     No orders found from 6/9/2018 to 6/12/2018.            Pending Results Instructions     If you had any lab results that were not finalized at the time of your Discharge, you can call the ED Lab Result RN at [phone]. You will be contacted by this team for any positive Lab results or changes in treatment. The nurses are available 7 days a week from 10A to 6:30P.  You can leave a message 24 hours per day and they will return your call.        Thank you for choosing [facility]       Thank you for choosing [facility] for your care. Our goal is always to provide you with excellent care. Hearing back from our patients is one way we can continue to improve our services. Please take a few minutes to complete the written survey that you may receive in the mail after you visit with us. Thank you!        [employer] Information     [employer] lets you send messages to your doctor, view your test results, renew your prescriptions, schedule appointments and more. To sign up, go to [email or web address] . Click on \"Log in\" on the left side of the screen, which will take you to the Welcome page. Then click on \"Sign up Now\" on the right side of the page.     You will be asked to enter the access code listed below, as well as some personal information. Please follow the directions to create your username " and password.     Your access code is: [email or web address]  Expires: 2018  6:21 PM     Your access code will  in 90 days. If you need help or a new code, please call your [facility] clinic or [phone].        Care EveryWhere ID     This is your Care EveryWhere ID. This could be used by other organizations to access your [facility] medical records  [id number]        Equal Access to Services     DALTON GRIMES : Hadii bere caldera hadasho Soomaali, waaxda luqadaha, qaybta kaalmada adeegyada, pop hairston . So [facility] [phone].    ATENCIÓN: Si habla español, tiene a nelson disposición servicios gratuitos de asistencia lingüística. Llame al [phone].    We comply with applicable federal civil rights laws and [place] laws. We do not discriminate on the basis of race, color, national origin, age, disability, sex, sexual orientation, or gender identity.            After Visit Summary       This is your record. Keep this with you and show to your community pharmacist(s) and doctor(s) at your next visit.

## 2018-08-12 ENCOUNTER — HOSPITAL ENCOUNTER (EMERGENCY)
Facility: CLINIC | Age: 33
Discharge: HOME | End: 2018-08-12
Attending: PSYCHIATRY & NEUROLOGY | Admitting: PSYCHIATRY & NEUROLOGY
Payer: COMMERCIAL

## 2018-08-12 VITALS
BODY MASS INDEX: 23.33 KG/M2 | WEIGHT: 158 LBS | OXYGEN SATURATION: 98 % | TEMPERATURE: 97.7 F | DIASTOLIC BLOOD PRESSURE: 61 MMHG | HEART RATE: 90 BPM | RESPIRATION RATE: 20 BRPM | SYSTOLIC BLOOD PRESSURE: 110 MMHG

## 2018-08-12 DIAGNOSIS — Z59.00 HOMELESS: ICD-10-CM

## 2018-08-12 DIAGNOSIS — F19.159: ICD-10-CM

## 2018-08-12 DIAGNOSIS — L03.115 CELLULITIS OF RIGHT LOWER EXTREMITY: ICD-10-CM

## 2018-08-12 DIAGNOSIS — Z76.5 MALINGERING: ICD-10-CM

## 2018-08-12 LAB
AMPHETAMINES UR QL SCN: POSITIVE
BARBITURATES UR QL: NEGATIVE
BENZODIAZ UR QL: NEGATIVE
CANNABINOIDS UR QL SCN: POSITIVE
COCAINE UR QL: NEGATIVE
ETHANOL UR QL SCN: NEGATIVE
OPIATES UR QL SCN: NEGATIVE

## 2018-08-12 PROCEDURE — 25000132 ZZH RX MED GY IP 250 OP 250 PS 637: Performed by: PSYCHIATRY & NEUROLOGY

## 2018-08-12 PROCEDURE — 99284 EMERGENCY DEPT VISIT MOD MDM: CPT | Mod: Z6 | Performed by: PSYCHIATRY & NEUROLOGY

## 2018-08-12 PROCEDURE — 94640 AIRWAY INHALATION TREATMENT: CPT | Performed by: PSYCHIATRY & NEUROLOGY

## 2018-08-12 PROCEDURE — 80307 DRUG TEST PRSMV CHEM ANLYZR: CPT | Performed by: PSYCHIATRY & NEUROLOGY

## 2018-08-12 PROCEDURE — 90791 PSYCH DIAGNOSTIC EVALUATION: CPT

## 2018-08-12 PROCEDURE — 25000132 ZZH RX MED GY IP 250 OP 250 PS 637: Performed by: EMERGENCY MEDICINE

## 2018-08-12 PROCEDURE — 99285 EMERGENCY DEPT VISIT HI MDM: CPT | Mod: 25 | Performed by: PSYCHIATRY & NEUROLOGY

## 2018-08-12 PROCEDURE — 80320 DRUG SCREEN QUANTALCOHOLS: CPT | Performed by: PSYCHIATRY & NEUROLOGY

## 2018-08-12 RX ORDER — ALBUTEROL SULFATE 90 UG/1
2 AEROSOL, METERED RESPIRATORY (INHALATION) ONCE
Status: COMPLETED | OUTPATIENT
Start: 2018-08-12 | End: 2018-08-12

## 2018-08-12 RX ORDER — CEPHALEXIN 500 MG/1
500 CAPSULE ORAL 4 TIMES DAILY
Qty: 28 CAPSULE | Refills: 0 | Status: SHIPPED | OUTPATIENT
Start: 2018-08-12 | End: 2018-08-19

## 2018-08-12 RX ORDER — OLANZAPINE 10 MG/1
10 TABLET ORAL AT BEDTIME
Qty: 10 TABLET | Refills: 0 | Status: SHIPPED | OUTPATIENT
Start: 2018-08-12

## 2018-08-12 RX ORDER — OLANZAPINE 10 MG/1
10 TABLET, ORALLY DISINTEGRATING ORAL ONCE
Status: COMPLETED | OUTPATIENT
Start: 2018-08-12 | End: 2018-08-12

## 2018-08-12 RX ORDER — CEPHALEXIN 500 MG/1
500 CAPSULE ORAL EVERY 6 HOURS SCHEDULED
Status: DISCONTINUED | OUTPATIENT
Start: 2018-08-12 | End: 2018-08-12 | Stop reason: HOSPADM

## 2018-08-12 RX ADMIN — ALBUTEROL SULFATE 2 PUFF: 90 AEROSOL, METERED RESPIRATORY (INHALATION) at 20:14

## 2018-08-12 RX ADMIN — CEPHALEXIN 500 MG: 500 CAPSULE ORAL at 13:34

## 2018-08-12 RX ADMIN — OLANZAPINE 10 MG: 10 TABLET, ORALLY DISINTEGRATING ORAL at 13:00

## 2018-08-12 RX ADMIN — CEPHALEXIN 500 MG: 500 CAPSULE ORAL at 20:14

## 2018-08-12 SDOH — ECONOMIC STABILITY - HOUSING INSECURITY: HOMELESSNESS UNSPECIFIED: Z59.00

## 2018-08-12 ASSESSMENT — ENCOUNTER SYMPTOMS
CONSTITUTIONAL NEGATIVE: 1
MUSCULOSKELETAL NEGATIVE: 1
GASTROINTESTINAL NEGATIVE: 1
NEUROLOGICAL NEGATIVE: 1
DECREASED CONCENTRATION: 1
HYPERACTIVE: 0
ENDOCRINE NEGATIVE: 1
RESPIRATORY NEGATIVE: 1
NERVOUS/ANXIOUS: 1
HALLUCINATIONS: 0
HEMATOLOGIC/LYMPHATIC NEGATIVE: 1
SLEEP DISTURBANCE: 1
CARDIOVASCULAR NEGATIVE: 1
EYES NEGATIVE: 1

## 2018-08-12 NOTE — DISCHARGE INSTRUCTIONS
I am sorry but we cannot accommodate you with a couple day's stay in the hospital.  [place]. Consider early walk-in appointments or call for an appointment.  Follow-up with established care and services, notably your primary provider regarding your ankle cellulitis/irritation

## 2018-08-12 NOTE — ED NOTES
Pt complains that he has not sllept for a whole day.  Dr Araujo stats it is all right that pt sleep for a little longer.  Pt is not sleepy from the zyprexa but is cooperative and a little anxious.

## 2018-08-12 NOTE — ED PROVIDER NOTES
"  History     Chief Complaint   Patient presents with     Psychiatric Evaluation     Homeless     The history is provided by the patient and medical records.     Moisés Cohn is a 33 year old male who is here after allegedly walking [place] [facility] [place] did not get his needs met, that being \"a meal, place to clean up/shower and staying a few days to get sleep.\" He has been abusing methamphetamine and has long history of polysubstance dependence. He has been homeless for many years. Patient has had recent visits to [facility] past several days. He reports wanting to get help but does not appear to take any steps to pursue a rule 25 on his own. Patient feels that his mental health is unstable and it is not due to his drugs as he had not used meth for a few days. He has not been able to sleep. He had requested admission and when it was denied, he escalated with multiple threats to [facility] admission. Patient was escorted out of [facility]. He was calm when talking to the . He was demanding to stay here a few days when I interviewed him. He does not exhibit overt psychosis nor symptoms of severe depression or agitation associated with instability of his mind. He mainly endorses such symptoms consistent with secondary gain for housing. Patient was not offered an admission.    He was offered a Zyprexa 10 mg and allowed to sleep 1-2 hours before he will be discharged. Patient agreed to this.    Please see DEC Crisis Assessment on 8/12/18 in [facility] for further details.    PERSONAL MEDICAL HISTORY  Past Medical History:   Diagnosis Date     Allergy      Anxiety      Asthma      Depressive disorder      Substance abuse      PAST SURGICAL HISTORY  Past Surgical History:   Procedure Laterality Date     ENT SURGERY      rhinoplasty     FAMILY HISTORY  Family History   Problem Relation Age of Onset     C.A.D. Maternal Grandmother      Neurologic Disorder Father      die from cyst in brain     Cerebrovascular Disease Mother      SOCIAL " HISTORY  Social History   Substance Use Topics     Smoking status: Current Every Day Smoker     Packs/day: 0.50     Years: 6.00     Types: Cigarettes     Smokeless tobacco: Never Used      Comment: Patient declined smoking cessation information     Alcohol use Yes      Comment: occasional     MEDICATIONS  Current Facility-Administered Medications   Medication     cephALEXin (KEFLEX) capsule 500 mg     Current Outpatient Prescriptions   Medication     ALBUTEROL 90 MCG/ACT IN AERS     methadone (DOLOPHINE-INTENSOL) 10 MG/ML (HIGH CONC) solution     OLANZapine (ZYPREXA) 5 MG tablet     ALLERGIES  Allergies   Allergen Reactions     Amoxicillin      Codeine      Patient stated that he has tolerated morphine in the past     Hydrocodone-Acetaminophen Itching     Morphine Swelling     Penicillins      Other reaction(s): As a Child, Unknown         I have reviewed the Medications, Allergies, Past Medical and Surgical History, and Social History in the Epic system.    Review of Systems   Constitutional: Negative.    HENT: Negative.    Eyes: Negative.    Respiratory: Negative.    Cardiovascular: Negative.    Gastrointestinal: Negative.    Endocrine: Negative.    Genitourinary: Negative.    Musculoskeletal: Negative.    Skin: Negative.    Neurological: Negative.    Hematological: Negative.    Psychiatric/Behavioral: Positive for behavioral problems, decreased concentration and sleep disturbance. Negative for hallucinations and suicidal ideas. The patient is nervous/anxious. The patient is not hyperactive.    All other systems reviewed and are negative.      Physical Exam   BP: 124/78  Pulse: 90  Temp: 97.7  F (36.5  C)  Resp: 16  Weight: 71.7 kg (158 lb)  SpO2: 97 %      Physical Exam   Constitutional: He appears well-developed and well-nourished.   HENT:   Head: Normocephalic.   Eyes: Pupils are equal, round, and reactive to light.   Abdominal: Soft.   Musculoskeletal: Normal range of motion.   Neurological: He is alert.    Skin: Skin is warm. Rash noted.   Psychiatric: His speech is normal and behavior is normal. Judgment and thought content normal. His affect is angry and labile. He is not agitated, not aggressive, not hyperactive, not actively hallucinating and not combative. Thought content is not paranoid and not delusional. Cognition and memory are normal. He expresses no homicidal and no suicidal ideation.   Nursing note and vitals reviewed.      ED Course     ED Course     Procedures      Labs Ordered and Resulted from Time of ED Arrival Up to the Time of Departure from the ED   DRUG ABUSE SCREEN 6 CHEM DEP URINE ([facility]) - Abnormal; Notable for the following:        Result Value    Amphetamine Qual Urine Positive (*)     Cannabinoids Qual Urine Positive (*)     All other components within normal limits            Assessments & Plan (with Medical Decision Making)   Patient with chemical dependence who is homeless and is malingering for housing. He is positive for both THC and methamphetamine (indicating recent use) rather than his report of no use for 4-5 days. Patient is given a prescription for Zyprexa 10 mg, #10. He also is given a prescription for Keflex for his soft tissue infection of his right ankle. He is encouraged to follow-up established care and services. He is to pursue an outpatient rule 25 for further CD treatment recommendations.    I have reviewed the nursing notes.    I have reviewed the findings, diagnosis, plan and need for follow up with the patient.    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

## 2018-08-12 NOTE — ED AVS SNAPSHOT
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      Follow-up Information     Follow up with No Ref-Primary, Physician.        Discharge Instructions       I am sorry but we cannot accommodate you with a couple day's stay in the hospital.  You NEED to pursue a rule 25 through the [facility]. Consider early walk-in appointments or call for an appointment.  Follow-up with established care and services, notably your primary provider regarding your ankle cellulitis/irritation    24 Hour Appointment Hotline       To make an appointment at any [facility] clinic, call [phone] ([phone]). If you don't have a family doctor or clinic, we will help you find one. [facility] clinics are conveniently located to serve the needs of you and your family.             Review of your medicines      START taking        Dose / Directions Last dose taken    cephALEXin 500 MG capsule   Commonly known as:  KEFLEX   Dose:  500 mg   Quantity:  28 capsule        Take 1 capsule (500 mg) by mouth 4 times daily for 7 days   Refills:  0          CONTINUE these medicines which may have CHANGED, or have new prescriptions. If we are uncertain of the size of tablets/capsules you have at home, strength may be listed as something that might have changed.        Dose / Directions Last dose taken    OLANZapine 10 MG tablet   Commonly known as:  zyPREXA   Dose:  10 mg   What changed:    - medication strength  - how much to take    Quantity:  10 tablet        Take 1 tablet (10 mg) by mouth At Bedtime   Refills:  0          Our records show that you are taking the medicines listed below. If these are incorrect, please call your family doctor or clinic.        Dose / Directions Last dose taken    albuterol 90 MCG/ACT inhaler   Quantity:  1        1-2 puffs Q 4-6 hrs prn   Refills:  11        methadone 10 MG/ML (HIGH CONC) solution   Commonly known as:  DOLOPHINE-INTENSOL   Dose:  100 mg        Take 100 mg by mouth daily   Refills:  0                Prescriptions were sent or printed at these locations (2 Prescriptions)                   Other Prescriptions                Printed at Department/Unit printer (2 of 2)         cephALEXin (KEFLEX) 500 MG capsule               OLANZapine (ZYPREXA) 10 MG tablet                Procedures and tests performed during your visit     Drug abuse screen 6 urine (tox)      Orders Needing Specimen Collection     None      Pending Results     No orders found from 8/10/2018 to 8/13/2018.            Pending Culture Results     No orders found from 8/10/2018 to 8/13/2018.            Pending Results Instructions     If you had any lab results that were not finalized at the time of your Discharge, you can call the ED Lab Result RN at [phone]. You will be contacted by this team for any positive Lab results or changes in treatment. The nurses are available 7 days a week from 10A to 6:30P.  You can leave a message 24 hours per day and they will return your call.        Thank you for choosing [facility]       Thank you for choosing [facility] for your care. Our goal is always to provide you with excellent care. Hearing back from our patients is one way we can continue to improve our services. Please take a few minutes to complete the written survey that you may receive in the mail after you visit with us. Thank you!        [employer] Information     [employer] lets you send messages to your doctor, view your test results,  "renew your prescriptions, schedule appointments and more. To sign up, go to [email or web address] . Click on \"Log in\" on the left side of the screen, which will take you to the Welcome page. Then click on \"Sign up Now\" on the right side of the page.     You will be asked to enter the access code listed below, as well as some personal information. Please follow the directions to create your username and password.     Your access code is: [email or web address]  Expires: 2018  6:21 PM     Your access code will  in 90 days. If you need help or a new code, please call your [facility] clinic or [phone].        Care EveryWhere ID     This is your Care EveryWhere ID. This could be used by other organizations to access your [facility] medical records  [id number]        Equal Access to Services     DALTON GRIMES : Daniel Rice, loi daley, gudelia lock, pop hairston . So [facility] [phone].    ATENCIÓN: Si habla español, tiene a nelson disposición servicios gratuitos de asistencia lingüística. Llame al [phone].    We comply with applicable federal civil rights laws and [place] laws. We do not discriminate on the basis of race, color, national origin, age, disability, sex, sexual orientation, or gender identity.            After Visit Summary       This is your record. Keep this with you and show to your community pharmacist(s) and doctor(s) at your next visit.                  "

## 2018-08-12 NOTE — ED AVS SNAPSHOT
[facility], Emergency Department    [address]    [facility] [address]    Phone:  [phone]    Fax:  [phone]                                       Moisés Cohn   MRN: [id number]    Department:  [facility], Emergency Department   Date of Visit:  8/12/2018           After Visit Summary Signature Page     I have received my discharge instructions, and my questions have been answered. I have discussed any challenges I see with this plan with the nurse or doctor.    ..........................................................................................................................................  Patient/Patient Representative Signature      ..........................................................................................................................................  Patient Representative Print Name and Relationship to Patient    ..................................................               ................................................  Date                                            Time    ..........................................................................................................................................  Reviewed by Signature/Title    ...................................................              ..............................................  Date                                                            Time

## 2020-04-25 ENCOUNTER — COMMUNICATION - HEALTHEAST (OUTPATIENT)
Dept: EMERGENCY MEDICINE | Facility: CLINIC | Age: 35
End: 2020-04-25

## 2021-06-20 NOTE — LETTER
Letter by Citlali Black RN at      Author: Citlali Black RN Service: -- Author Type: --    Filed:  Encounter Date: 4/25/2020 Status: (Other)       4/25/2020        Moisés Cohn  [facility] [id number]    This letter provides a written record that you were tested for COVID-19 on 4/25/2020.     Your result was negative.    This means that we didnt find the virus that causes COVID-19 in your sample. A test may show negative when you do actually have the virus. This can happen when the virus is in the early stages of infection, before you feel illness symptoms.    Even if you dont have symptoms, they may still appear. For safety, its very important to follow these rules.    Keep yourself away from others (self-isolation):      Stay home. Dont go to work, school or anywhere else.     Stay away from others in your home. No hugging, kissing or shaking hands.    Dont let anyone visit.    Cover your mouth and nose with a mask, tissue or wash cloth to avoid spreading germs.    Stay in self-isolation until you meet ALL of the guidelines below:    1. You have had no fever for at least 72 hours (that is 3 full days of no fever without the use of medicine that reduces fevers), AND  2. other symptoms (such as cough, shortness of breath) have gotten better, AND  3. at least 7 days have passed since your symptoms first appeared.    Going back to work  Check with your employer for any guidelines to follow for going back to work.    Employers: This document serves as formal notice that your employee tested negative for COVID-19, as of the testing date shown above.    For questions regarding this letter or the Negative COVID-19 result, call [phone] between 8A to 6:30P (M-F) and 10A to 6:30P (weekends).